# Patient Record
Sex: FEMALE | Race: WHITE
[De-identification: names, ages, dates, MRNs, and addresses within clinical notes are randomized per-mention and may not be internally consistent; named-entity substitution may affect disease eponyms.]

---

## 2019-03-19 NOTE — NUR
PT RESTING QUIETLY, NO ACUTE DISTRESS NOTED, RESP EVEN AND UNLABORED. CALL 
LIGHT WITHIN REACH. WILL CONTINUE TO MONITOR PT CLOSELY.

## 2019-03-19 NOTE — NUR
IV removed. Catheter intact and site benign. Pressure and 4x4 applied to site. 
No bleeding noted. Patient discharged to home in stable condition. Written and 
verbal after care instructions given. Patient verbalizes understanding of 
instruction. ambulatory with a steady gait noted. advice pt not to drive or 
operate any machinery due to pt was given benadryl. pt verbalize understanding. 
pt friend at bedside to take pt home.

## 2019-05-03 ENCOUNTER — HOSPITAL ENCOUNTER (INPATIENT)
Dept: HOSPITAL 54 - ER | Age: 44
LOS: 2 days | Discharge: HOME | DRG: 384 | End: 2019-05-05
Attending: INTERNAL MEDICINE | Admitting: INTERNAL MEDICINE
Payer: COMMERCIAL

## 2019-05-03 VITALS — BODY MASS INDEX: 20.76 KG/M2 | WEIGHT: 137 LBS | HEIGHT: 68 IN

## 2019-05-03 VITALS — DIASTOLIC BLOOD PRESSURE: 56 MMHG | SYSTOLIC BLOOD PRESSURE: 110 MMHG

## 2019-05-03 VITALS — SYSTOLIC BLOOD PRESSURE: 110 MMHG | DIASTOLIC BLOOD PRESSURE: 68 MMHG

## 2019-05-03 VITALS — SYSTOLIC BLOOD PRESSURE: 113 MMHG | DIASTOLIC BLOOD PRESSURE: 52 MMHG

## 2019-05-03 DIAGNOSIS — G89.4: ICD-10-CM
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
## 2019-05-03 DIAGNOSIS — F41.9: ICD-10-CM

## 2019-05-03 DIAGNOSIS — J45.20: ICD-10-CM

## 2019-05-03 DIAGNOSIS — E87.6: ICD-10-CM

## 2019-05-03 DIAGNOSIS — G93.6: ICD-10-CM

## 2019-05-03 DIAGNOSIS — Z88.0: ICD-10-CM

## 2019-05-03 DIAGNOSIS — S00.93XA: Primary | ICD-10-CM

## 2019-05-03 DIAGNOSIS — E87.1: ICD-10-CM

## 2019-05-03 DIAGNOSIS — I10: ICD-10-CM

## 2019-05-03 DIAGNOSIS — G62.9: ICD-10-CM
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
## 2019-05-03 LAB
ALBUMIN SERPL BCP-MCNC: 3.7 G/DL (ref 3.4–5)
ALP SERPL-CCNC: 47 U/L (ref 46–116)
ALT SERPL W P-5'-P-CCNC: 37 U/L (ref 12–78)
APPEARANCE UR: CLEAR
AST SERPL W P-5'-P-CCNC: 60 U/L (ref 15–37)
BASOPHILS # BLD AUTO: 0.1 /CMM (ref 0–0.2)
BASOPHILS NFR BLD AUTO: 0.7 % (ref 0–2)
BILIRUB DIRECT SERPL-MCNC: 0.2 MG/DL (ref 0–0.2)
BILIRUB SERPL-MCNC: 0.9 MG/DL (ref 0.2–1)
BILIRUB UR QL STRIP: NEGATIVE
BUN SERPL-MCNC: 9 MG/DL (ref 7–18)
CALCIUM SERPL-MCNC: 8.6 MG/DL (ref 8.5–10.1)
CHLORIDE SERPL-SCNC: 96 MMOL/L (ref 98–107)
CO2 SERPL-SCNC: 27 MMOL/L (ref 21–32)
COLOR UR: YELLOW
CREAT SERPL-MCNC: 0.8 MG/DL (ref 0.6–1.3)
EOSINOPHIL NFR BLD AUTO: 2.5 % (ref 0–6)
GLUCOSE SERPL-MCNC: 79 MG/DL (ref 74–106)
GLUCOSE UR STRIP-MCNC: NEGATIVE MG/DL
HCT VFR BLD AUTO: 37 % (ref 33–45)
HGB BLD-MCNC: 12.5 G/DL (ref 11.5–14.8)
HGB UR QL STRIP: NEGATIVE ERY/UL
KETONES UR STRIP-MCNC: (no result) MG/DL
LEUKOCYTE ESTERASE UR QL STRIP: NEGATIVE
LYMPHOCYTES NFR BLD AUTO: 1.8 /CMM (ref 0.8–4.8)
LYMPHOCYTES NFR BLD AUTO: 21.7 % (ref 20–44)
MCHC RBC AUTO-ENTMCNC: 34 G/DL (ref 31–36)
MCV RBC AUTO: 96 FL (ref 82–100)
MONOCYTES NFR BLD AUTO: 0.4 /CMM (ref 0.1–1.3)
MONOCYTES NFR BLD AUTO: 4.2 % (ref 2–12)
NEUTROPHILS # BLD AUTO: 6 /CMM (ref 1.8–8.9)
NEUTROPHILS NFR BLD AUTO: 70.9 % (ref 43–81)
NITRITE UR QL STRIP: NEGATIVE
PH UR STRIP: 6 [PH] (ref 5–8)
PLATELET # BLD AUTO: 360 /CMM (ref 150–450)
POTASSIUM SERPL-SCNC: 4.3 MMOL/L (ref 3.5–5.1)
PROT SERPL-MCNC: 7.5 G/DL (ref 6.4–8.2)
PROT UR QL STRIP: NEGATIVE MG/DL
RBC # BLD AUTO: 3.83 MIL/UL (ref 4–5.2)
RBC #/AREA URNS HPF: (no result) /HPF (ref 0–2)
SODIUM SERPL-SCNC: 134 MMOL/L (ref 136–145)
UROBILINOGEN UR STRIP-MCNC: 0.2 EU/DL
WBC #/AREA URNS HPF: (no result) /HPF (ref 0–3)
WBC NRBC COR # BLD AUTO: 8.4 K/UL (ref 4.3–11)

## 2019-05-03 PROCEDURE — G0378 HOSPITAL OBSERVATION PER HR: HCPCS

## 2019-05-03 RX ADMIN — OXYCODONE HYDROCHLORIDE AND ACETAMINOPHEN PRN UDTAB: 5; 325 TABLET ORAL at 17:07

## 2019-05-03 RX ADMIN — DEXTROSE MONOHYDRATE PRN MG: 50 INJECTION, SOLUTION INTRAVENOUS at 16:34

## 2019-05-03 RX ADMIN — OXYCODONE HYDROCHLORIDE AND ACETAMINOPHEN PRN UDTAB: 5; 325 TABLET ORAL at 21:16

## 2019-05-03 RX ADMIN — TRAMADOL HYDROCHLORIDE PRN MG: 50 TABLET, FILM COATED ORAL at 11:21

## 2019-05-03 RX ADMIN — SODIUM CHLORIDE PRN MLS/HR: 9 INJECTION, SOLUTION INTRAVENOUS at 12:10

## 2019-05-03 RX ADMIN — TRAMADOL HYDROCHLORIDE PRN MG: 50 TABLET, FILM COATED ORAL at 22:27

## 2019-05-03 NOTE — NUR
PT BIBSELF C/C POSTERIOR HEAD PAIN S/P FELT LEGS GIVE OUT, HIT HEAD ON GROUND 
YESTERDAY. NO LOC, HX NEUROPATHY. PT AOX4. NAD NOTED. RESP EVEN AND UNLABORED. 
PT ON MONITOR IN BED 9 WITH FAMILY AT BEDSIDE. WILL CONTINUE TO MONITOR.

## 2019-05-03 NOTE — NUR
RN TELE NOTE 



PATIENT IS AOX3, SPEECH CLEAR,  AT BEDSIDE, ABLE TO MAKE NEEDS KNOWN, ON TELE SR, ON 
ROOM AIR, NO S/SX OF CARDIAC OR RESPIRATORY DISTRESS, LEFT HAND IV PATENT FLUSHING WELL WITH 
NS AT 75 ML/HR, SKIN KEPT CLEAN AND DRY, SAFETY MAINTAINED AT ALL TIMES, BED IN LOW LOCKED 
POSITION, CALL LIGHT WITHIN REACH, WILL CONTINUE TO MONITOR FOR ANY CHANGES IN CONDITION.

## 2019-05-03 NOTE — NUR
TELE RN NOTE 

ASSISTED TO BEDSIDE COMMODE ABLE TO URINATE, INSTRUCTED PATIENT TO ASK FOR ASSISTANCE WHEN 
RHIANNON TO TO GO ON  BSC,  CALL LIGHT WITHIN REACH

## 2019-05-03 NOTE — NUR
PATIENT STATED SHE'S UNABLE TO PROVIDE UA SAMPLE AT THIS TIME, DECIDED TO SIGN 
A PREGNANCY WAIVER TO HAVE THE CT. INFORMED DR. OGDEN PATIENT'S STILL 
NAUSEATED, RECEIVED VERBAL ORDER FOR ZOFRAN 4MG SL X1. ORDER NOTED AND CARRIED 
OUT.

## 2019-05-03 NOTE — NUR
TELE RN NOTE 

 RECEIVED PATIENT I FROM ER WITH DX  BRAIN INJURY AND SMALL HEMORRHAGIC CONTUSION S\P FALL, 
ALERT , ORIENTED X3 VS TAKEN PLACED ON TELE MONITOR SR 61 , C\O SEVERE HEADACHE AND LAW PAIN 
DUE TO ORAL SURGERY RECENTLY,ON RA, NO SOB NOTED AT THIS TIME   , ADMITTED UNDER CARE SARAH ABBASI, PLAN OF CARE DISCUSSED WITH PATIENT  CALL LIGHT WITHIN REACH , SAFETY MEASURE 
OBSERVED, BED IN LOWEST AND LOCKED POSITION, BED ALARM IN PLACE , WILL CONT TO MONITOR 
CLOSELY ,

## 2019-05-03 NOTE — NUR
TELE RN NOTE 

 C\O SEVERE HEADACHE 9\10 /52 CALLED TO DR ABBASI WITH ORDER PERCOCET PO , PATIENT 
ALLERGY HAS TO HYDROCODONE BUT PATIENT STATED ITS OK SHE ,WAS TAKEN BEFORE

## 2019-05-03 NOTE — NUR
TELE RN NOTE

 TRAMADOL 50 MG PO GIVEN AT 1130 PAIN SLIGHTLY SUBSIDED BUT STILL IN PAIN ,WILL CONT TO 
MONITOR CLOSELY

## 2019-05-03 NOTE — NUR
DR. OGDEN INFORMED PATIENT SHE WILL BE ADMITTED IN THE HOSPITAL, LABS DRAWN 
AND SENT TO LAB, ESTABLISHED AN IV ACCESS ON RFA G20. PATIENT CRYING AND 
ANXIOUS ABOUT STAYING IN THE HOSPITAL.

## 2019-05-04 VITALS — DIASTOLIC BLOOD PRESSURE: 74 MMHG | SYSTOLIC BLOOD PRESSURE: 110 MMHG

## 2019-05-04 VITALS — DIASTOLIC BLOOD PRESSURE: 61 MMHG | SYSTOLIC BLOOD PRESSURE: 125 MMHG

## 2019-05-04 VITALS — DIASTOLIC BLOOD PRESSURE: 95 MMHG | SYSTOLIC BLOOD PRESSURE: 143 MMHG

## 2019-05-04 VITALS — SYSTOLIC BLOOD PRESSURE: 86 MMHG | DIASTOLIC BLOOD PRESSURE: 47 MMHG

## 2019-05-04 VITALS — DIASTOLIC BLOOD PRESSURE: 47 MMHG | SYSTOLIC BLOOD PRESSURE: 96 MMHG

## 2019-05-04 VITALS — SYSTOLIC BLOOD PRESSURE: 139 MMHG | DIASTOLIC BLOOD PRESSURE: 74 MMHG

## 2019-05-04 LAB
BASOPHILS # BLD AUTO: 0 /CMM (ref 0–0.2)
BASOPHILS NFR BLD AUTO: 0.7 % (ref 0–2)
BUN SERPL-MCNC: 6 MG/DL (ref 7–18)
CALCIUM SERPL-MCNC: 8.2 MG/DL (ref 8.5–10.1)
CHLORIDE SERPL-SCNC: 102 MMOL/L (ref 98–107)
CO2 SERPL-SCNC: 26 MMOL/L (ref 21–32)
CREAT SERPL-MCNC: 0.8 MG/DL (ref 0.6–1.3)
EOSINOPHIL NFR BLD AUTO: 4.2 % (ref 0–6)
GLUCOSE SERPL-MCNC: 136 MG/DL (ref 74–106)
HCT VFR BLD AUTO: 34 % (ref 33–45)
HGB BLD-MCNC: 11.2 G/DL (ref 11.5–14.8)
LYMPHOCYTES NFR BLD AUTO: 1.6 /CMM (ref 0.8–4.8)
LYMPHOCYTES NFR BLD AUTO: 32.4 % (ref 20–44)
MAGNESIUM SERPL-MCNC: 2 MG/DL (ref 1.8–2.4)
MCHC RBC AUTO-ENTMCNC: 33 G/DL (ref 31–36)
MCV RBC AUTO: 97 FL (ref 82–100)
MONOCYTES NFR BLD AUTO: 0.3 /CMM (ref 0.1–1.3)
MONOCYTES NFR BLD AUTO: 6 % (ref 2–12)
NEUTROPHILS # BLD AUTO: 2.7 /CMM (ref 1.8–8.9)
NEUTROPHILS NFR BLD AUTO: 56.7 % (ref 43–81)
PHOSPHATE SERPL-MCNC: 3.1 MG/DL (ref 2.5–4.9)
PLATELET # BLD AUTO: 292 /CMM (ref 150–450)
POTASSIUM SERPL-SCNC: 3.4 MMOL/L (ref 3.5–5.1)
RBC # BLD AUTO: 3.5 MIL/UL (ref 4–5.2)
SODIUM SERPL-SCNC: 137 MMOL/L (ref 136–145)
WBC NRBC COR # BLD AUTO: 4.8 K/UL (ref 4.3–11)

## 2019-05-04 RX ADMIN — OXYCODONE HYDROCHLORIDE AND ACETAMINOPHEN PRN UDTAB: 5; 325 TABLET ORAL at 14:18

## 2019-05-04 RX ADMIN — SODIUM CHLORIDE PRN MLS/HR: 9 INJECTION, SOLUTION INTRAVENOUS at 03:33

## 2019-05-04 RX ADMIN — ACETAMINOPHEN PRN MG: 325 TABLET ORAL at 04:49

## 2019-05-04 RX ADMIN — DEXTROSE MONOHYDRATE PRN MG: 50 INJECTION, SOLUTION INTRAVENOUS at 11:40

## 2019-05-04 RX ADMIN — OXYCODONE HYDROCHLORIDE AND ACETAMINOPHEN PRN UDTAB: 5; 325 TABLET ORAL at 08:03

## 2019-05-04 RX ADMIN — OXYCODONE HYDROCHLORIDE AND ACETAMINOPHEN PRN UDTAB: 5; 325 TABLET ORAL at 23:42

## 2019-05-04 RX ADMIN — DEXTROSE MONOHYDRATE PRN MG: 50 INJECTION, SOLUTION INTRAVENOUS at 23:37

## 2019-05-04 RX ADMIN — OXYCODONE HYDROCHLORIDE AND ACETAMINOPHEN PRN UDTAB: 5; 325 TABLET ORAL at 03:33

## 2019-05-04 RX ADMIN — TRAMADOL HYDROCHLORIDE PRN MG: 50 TABLET, FILM COATED ORAL at 11:40

## 2019-05-04 RX ADMIN — TRAMADOL HYDROCHLORIDE PRN MG: 50 TABLET, FILM COATED ORAL at 20:46

## 2019-05-04 NOTE — NUR
TELE RN NOTES-- CALLED DR. TYSON: PT C/O HEADACHES THAT WONT GO AWAY AND DIFFICULTY 
URINATING. PAIN MEDICATIONS HAVE BEEN INEFFECTIVE. WAITING A CALL BACK.

## 2019-05-04 NOTE — NUR
TELE RN NOTES-- DR. CAPUTO MADE AWARE THAT PT AND  WANTS TO SPEAK WITH HIM. ASKED 
DR. CAPUTO FOR OFFICE NUMBER AND STATED THAT HE WILL BE IN TO SEE THE PT IN 20 MINS. PT 
MADE AWARE.

## 2019-05-04 NOTE — NUR
DR. LANDRY AND DR. CAPUTO NOTIFIED REGARDING PT. PERSISTENT HEADACHE AND ITCHINESS,NO NEW 
ORDERS FOR PAIN BUT PER NEURO ORDERED REPAT HEAD CT WITHOUT CONTRAST, AWAITS RADIOLOGY FOR 
CT,PT. NOTIFIED BY PRIMARY RN REGARDING PLAN OF CARE.

## 2019-05-04 NOTE — NUR
TELE RN NOTES-- PT WAS SEEN AND EXAMINED BY DR. CAPUTO W/ ORDERS FOR ONE TIME DOSE OF 
PERCOCET 5/325 2 TABS PO ONE TIME, ZOFRAN 4MG IVP X1 AND TO ADMINISTER BENADRYL AS ORDERED. 
ORDERS READ BACK AND VERIFIED, NOTED AND CARRIED OUT.

## 2019-05-04 NOTE — NUR
TELE RN OPENING NOTES



PATIENT RESTING IN BED. BOYFRIEND AT BEDSIDE. A/O X4. RESPIRATIONS ARE EVEN AND UNLABORED, 
NOT IN ANY ACUTE DISTRESS NOTED. DENIES ANY SOB. IV SITE TO L HAND INTACT, 0.9% NS RUNNING 
AT 75ML/HR, NO INFILTRATION NOTED. DRESSING CLEAN AND DRY. SAFETY MEASURES ARE IN PLACE. 
REMINDED PT TO USE CALL LIGHT WHEN ASSISTANCE IS NEEDED, CALL LIGHT WITHIN REACH. WILL CONT 
TO MONITOR PATIENT CLOSELY.

## 2019-05-04 NOTE — NUR
TELE RN NOTES-- RELAYED CT HEAD RESULTS TO DR. CAPUTO AND STATED THAT HE WILL BE IN OT SEE 
THE PT IN 1-2 HOURS.

## 2019-05-04 NOTE — NUR
TELE RN NOTES-- PT IS REQUESTING FOR A MUSCLE RELAXANT. CALLED DR. CAPUTO, STILL WAITING A 
CALL BACK.

## 2019-05-04 NOTE — NUR
TELE RN NOTES-- PT WAS SEEN AND EXAMINED BY DR. HIDALGO. Viviana HI WENT INTO DETAIL RE: 
THE CT HEAD RESULTS AND SIGNS AND SYMPTOMS. PT AND  BOTH ARE AWARE AND AGREED WITH 
THE POC.

## 2019-05-04 NOTE — NUR
head ct done ,awaits result.offered ice chips r/t nausea.pt s/p medicated anti nausea meds 
an hour ago,will continue to monitor.

## 2019-05-04 NOTE — NUR
TELE RN NOTES-- RECEIVED A CALL BACK FROM DR. CAPUTO RE: PT REQUESTING A MUSCLE RELAXANT. 
PER DR. CAPUTO, "NO." PT MADE AWARE THAT A MUSCLE RELAXANT IS NOT BENEFICIAL DUE TO HER 
CONDITION.

## 2019-05-04 NOTE — NUR
TELE RN OPENING NOTES



RECEIVED PT LAYING IN BED WITH HOB SLIGHTLY ELEVATED. PT IS A/O X3, AFEBRILE. RESPIRATIONS 
ARE EVEN AND UNLABORED, NOT IN ANY ACUTE DISTRESS NOTED.C/O GENERALIZED PAIN W/ PL 10/10. 
WILL MEDICATE ACCORDINGLY. DENIES ANY SOB, N/V. IV SITE TO L HAND INTACT, NO INFILTRATION 
NOTED. DRESSING KEPT CLEAN AND DRY. SAFETY MEASURES ARE IN PLACE. INSTRUCTED PT TO USE CALL 
LIGHT WHEN ASSISTANCE IS NEEDED, CALL LIGHT IS LEFT WITHIN REACH. WILL MONITOR THROUGHOUT 
SHIFT FOR CONTINUITY OF CARE.

## 2019-05-04 NOTE — NUR
TELE RN NOTES-- CALLED DR. LOWRY RE: PT C/O ITCHINESS AFTER RECEIVING PERCOCET AND IS 
REQUESTING ANOTHER DOSE OF BENADRYL. PT IS STILL C/O "STRONG HEADACHES" THAT WONT GO AWAY. 
STILL WAITING A CALL BACK.

## 2019-05-04 NOTE — NUR
TELE RN CLOSING NOTE



ALL DUE MEDS GIVEN, NEEDS MET AND RENDERED. PT REMAINS A/O X4, AFEBRILE. RESPIRATIONS ARE 
EVEN AND UNLABORED, NOT IN ANY ACUTE DISTRESS NOTED. PT MEDICATED FOR PAIN AND NAUSEA, NOTED 
TO BE EFFECTIVE AT THIS TIME. DENIES ANY SOB. IV SITE TO L HAND INTACT, NO INFILTRATION 
NOTED. DRESSING KEPT CLEAN AND DRY. SAFETY MEASURES ARE IN PLACE. REMINDED PT TO USE CALL 
LIGHT WHEN ASSISTANCE IS NEEDED, CALL LIGHT IS LEFT WITHIN REACH. WILL ENDORSE TO NEXT SHIFT 
FOR CONTINUITY OF CARE.

## 2019-05-05 VITALS — SYSTOLIC BLOOD PRESSURE: 148 MMHG | DIASTOLIC BLOOD PRESSURE: 97 MMHG

## 2019-05-05 VITALS — SYSTOLIC BLOOD PRESSURE: 151 MMHG | DIASTOLIC BLOOD PRESSURE: 90 MMHG

## 2019-05-05 VITALS — DIASTOLIC BLOOD PRESSURE: 83 MMHG | SYSTOLIC BLOOD PRESSURE: 166 MMHG

## 2019-05-05 VITALS — SYSTOLIC BLOOD PRESSURE: 140 MMHG | DIASTOLIC BLOOD PRESSURE: 93 MMHG

## 2019-05-05 LAB
BUN SERPL-MCNC: 3 MG/DL (ref 7–18)
CALCIUM SERPL-MCNC: 9 MG/DL (ref 8.5–10.1)
CHLORIDE SERPL-SCNC: 106 MMOL/L (ref 98–107)
CO2 SERPL-SCNC: 24 MMOL/L (ref 21–32)
CREAT SERPL-MCNC: 0.6 MG/DL (ref 0.6–1.3)
GLUCOSE SERPL-MCNC: 112 MG/DL (ref 74–106)
POTASSIUM SERPL-SCNC: 3.9 MMOL/L (ref 3.5–5.1)
SODIUM SERPL-SCNC: 142 MMOL/L (ref 136–145)

## 2019-05-05 RX ADMIN — ACETAMINOPHEN PRN MG: 325 TABLET ORAL at 11:03

## 2019-05-05 RX ADMIN — OXYCODONE HYDROCHLORIDE AND ACETAMINOPHEN PRN UDTAB: 5; 325 TABLET ORAL at 09:37

## 2019-05-05 RX ADMIN — SODIUM CHLORIDE PRN MLS/HR: 9 INJECTION, SOLUTION INTRAVENOUS at 01:26

## 2019-05-05 RX ADMIN — DEXTROSE MONOHYDRATE PRN MG: 50 INJECTION, SOLUTION INTRAVENOUS at 08:13

## 2019-05-05 NOTE — NUR
MS RN NOTES



WOUND DOCUMENTATION PICTURE TAKEN ON LEFT 2ND TOE. PATIENT DID NOT WANT TO HAVE LIGHTS 
TURNED ON. EXPLAINED THAT PICTURE PRINT OUT WILL BE DARK BUT PATIENT INSISTED TO KEEP LIGHTS 
OFF.

## 2019-05-05 NOTE — NUR
TELE RN CLOSING NOTES



PATIENT SLEEPING IN BED, BUT EASY TO AROUSE. A/O X4. RESPIRATIONS ARE EVEN AND UNLABORED, 
NOT IN ANY ACUTE DISTRESS NOTED AT THIS TIME. DENIES ANY SOB. IV SITE TO L HAND INTACT, 
DRESSING CLEAN AND DRY. SAFETY MEASURES ARE IN PLACE. REMINDED PT TO USE CALL LIGHT WHEN 
ASSISTANCE IS NEEDED, CALL LIGHT WITHIN REACH. ALL MD ORDERS ATTENDED. WILL ENDORSE TO DAY 
SHIFT NURSE FOR TANNER.

## 2019-05-05 NOTE — NUR
TELE RN NOTES



PATIENT RECEIVED RESTING INSIDE ROOM. SLEEPING, EASILY AROUSABLE THROUGH VERBAL AND TACTILE 
STIMULI. BREATHING EVEN AND UNLABORED. NO ACUTE DISTRESS AT THIS TIME. IV INTACT AND PATENT. 
CONTINUE ON TELEMETRY, CARDIAC MONITOR IN PLACE,  BPM. NO C/O PAIN OR DISCOMFORT AT 
THIS TIME. SAFETY PRECAUTIONS IN PLACE. WILL CONTINUE TO MONITOR. BED LOCKED AND IN LOW 
POSITION. BILATERAL UPPER SIDE RAILS UP AND LOCKED. CALL LIGHT WITHIN EASY REACH

## 2019-05-05 NOTE — NUR
MS RN NOTES



PATIENT FOR DISCHARGE HOME TODAY. DISCHARGE INSTRUCTIONS GIVEN AND PATIENT VERBALIZED 
UNDERSTANDING. IV REMOVED WITH MINIMAL BLEEDING NOTED, IV TIP INTACT, PRESSURE DRESSING 
PLACED ON SITE. WRISTBAND REMOVED FROM WRIST AND PATIENT ASKED IF SHE CAN KEEP THE 
WRISTBAND. ALL BELONGINGS COMPLETE, NO REPORT OF MISSING INVENTORY. WRITTEN PRESCRIPTION 
GIVEN TO PATIENT. REFUSED TO HAVE PICTURE OF LEFT 2ND TOE RETAKEN FOR WOUND DOCUMENTATION. 
RISKS AND BENEFITS EXPLAINED BUT TO NO AVAIL, PATIENT STRONGLY REFUSED. PATIENT LEFT UNIT AT 
1315 VIA WHEELCHAIR, ACCOMPANIED BY NURSING STAFF TO PARKING LOT. LEFT HOSPITAL PREMISES VIA 
PRIVATE CAR WITH ANTOLIN IBARRA. MD AWARE.

## 2019-05-05 NOTE — NUR
MS RN NOTES



PATIENT SEEN BY DR. DICKINSON (NEURO). VERBALIZED THAT PATIENT IS CLEARED TO DISCHARGE PER 
NEURO. DR LOWRY MADE AWARE. WILL CONTINUE TO MONITOR

## 2019-05-05 NOTE — NUR
TELE RN NOTES



PATIENT BEGGED FOR AMBIEN AND STATED "PLEASE, I HAVE INSOMNIA AND I REALLY NEED IT. PLEASE." 
WILL GIVE AMBIEN FOR SLEEP/INSOMNIA. WILL CONTINUE TO MONITOR PT.

## 2019-05-31 ENCOUNTER — HOSPITAL ENCOUNTER (EMERGENCY)
Dept: HOSPITAL 54 - ER | Age: 44
Discharge: HOME | End: 2019-05-31
Payer: COMMERCIAL

## 2019-05-31 VITALS — HEIGHT: 68 IN | BODY MASS INDEX: 19.55 KG/M2 | WEIGHT: 129 LBS

## 2019-05-31 VITALS — DIASTOLIC BLOOD PRESSURE: 98 MMHG | SYSTOLIC BLOOD PRESSURE: 155 MMHG

## 2019-05-31 DIAGNOSIS — G62.9: ICD-10-CM

## 2019-05-31 DIAGNOSIS — F17.200: ICD-10-CM

## 2019-05-31 DIAGNOSIS — Z79.899: ICD-10-CM

## 2019-05-31 DIAGNOSIS — I10: ICD-10-CM

## 2019-05-31 DIAGNOSIS — Z88.6: ICD-10-CM

## 2019-05-31 DIAGNOSIS — Z88.0: ICD-10-CM

## 2019-05-31 DIAGNOSIS — R11.2: Primary | ICD-10-CM

## 2019-05-31 LAB
ALBUMIN SERPL BCP-MCNC: 4.6 G/DL (ref 3.4–5)
ALP SERPL-CCNC: 46 U/L (ref 46–116)
ALT SERPL W P-5'-P-CCNC: 25 U/L (ref 12–78)
AST SERPL W P-5'-P-CCNC: 31 U/L (ref 15–37)
BASOPHILS # BLD AUTO: 0 /CMM (ref 0–0.2)
BASOPHILS NFR BLD AUTO: 0.5 % (ref 0–2)
BILIRUB DIRECT SERPL-MCNC: 0.4 MG/DL (ref 0–0.2)
BILIRUB SERPL-MCNC: 2 MG/DL (ref 0.2–1)
BUN SERPL-MCNC: 7 MG/DL (ref 7–18)
CALCIUM SERPL-MCNC: 9.3 MG/DL (ref 8.5–10.1)
CHLORIDE SERPL-SCNC: 96 MMOL/L (ref 98–107)
CO2 SERPL-SCNC: 26 MMOL/L (ref 21–32)
CREAT SERPL-MCNC: 0.7 MG/DL (ref 0.6–1.3)
EOSINOPHIL NFR BLD AUTO: 0.1 % (ref 0–6)
ETHANOL SERPL-MCNC: 90 MG/DL (ref 0–0)
GLUCOSE SERPL-MCNC: 82 MG/DL (ref 74–106)
HCT VFR BLD AUTO: 46 % (ref 33–45)
HGB BLD-MCNC: 15.5 G/DL (ref 11.5–14.8)
LIPASE SERPL-CCNC: 125 U/L (ref 73–393)
LYMPHOCYTES NFR BLD AUTO: 1.2 /CMM (ref 0.8–4.8)
LYMPHOCYTES NFR BLD AUTO: 13.5 % (ref 20–44)
MCHC RBC AUTO-ENTMCNC: 34 G/DL (ref 31–36)
MCV RBC AUTO: 94 FL (ref 82–100)
MONOCYTES NFR BLD AUTO: 0.7 /CMM (ref 0.1–1.3)
MONOCYTES NFR BLD AUTO: 7.5 % (ref 2–12)
NEUTROPHILS # BLD AUTO: 7.1 /CMM (ref 1.8–8.9)
NEUTROPHILS NFR BLD AUTO: 78.4 % (ref 43–81)
PLATELET # BLD AUTO: 297 /CMM (ref 150–450)
POTASSIUM SERPL-SCNC: 4 MMOL/L (ref 3.5–5.1)
PROT SERPL-MCNC: 8.2 G/DL (ref 6.4–8.2)
RBC # BLD AUTO: 4.84 MIL/UL (ref 4–5.2)
SODIUM SERPL-SCNC: 138 MMOL/L (ref 136–145)
WBC NRBC COR # BLD AUTO: 9.1 K/UL (ref 4.3–11)

## 2019-05-31 PROCEDURE — 80076 HEPATIC FUNCTION PANEL: CPT

## 2019-05-31 PROCEDURE — 84702 CHORIONIC GONADOTROPIN TEST: CPT

## 2019-05-31 PROCEDURE — 96361 HYDRATE IV INFUSION ADD-ON: CPT

## 2019-05-31 PROCEDURE — 80307 DRUG TEST PRSMV CHEM ANLYZR: CPT

## 2019-05-31 PROCEDURE — 96374 THER/PROPH/DIAG INJ IV PUSH: CPT

## 2019-05-31 PROCEDURE — 83690 ASSAY OF LIPASE: CPT

## 2019-05-31 PROCEDURE — 85025 COMPLETE CBC W/AUTO DIFF WBC: CPT

## 2019-05-31 PROCEDURE — 36415 COLL VENOUS BLD VENIPUNCTURE: CPT

## 2019-05-31 PROCEDURE — G0480 DRUG TEST DEF 1-7 CLASSES: HCPCS

## 2019-05-31 PROCEDURE — 96375 TX/PRO/DX INJ NEW DRUG ADDON: CPT

## 2019-05-31 PROCEDURE — 96376 TX/PRO/DX INJ SAME DRUG ADON: CPT

## 2019-05-31 PROCEDURE — 99283 EMERGENCY DEPT VISIT LOW MDM: CPT

## 2019-05-31 PROCEDURE — 80048 BASIC METABOLIC PNL TOTAL CA: CPT

## 2019-05-31 NOTE — NUR
C/O VOMITING STARTED 10 HRS AGO,-DIARRHEA. PATIENT A/OX3, BREATHING EVEN AND 
UNLABORED, NO SOB NOTED, PLACED ON THE MONITOR.

## 2019-05-31 NOTE — NUR
Patient stated she "felt better." Denies nausea/vomiting at this time. PIV 
removed. Rx provided. Patient discharged to home in stable condition. Written 
and verbal after care instructions given. Patient verbalizes understanding of 
instruction.

## 2019-06-17 ENCOUNTER — HOSPITAL ENCOUNTER (EMERGENCY)
Dept: HOSPITAL 54 - ER | Age: 44
Discharge: HOME | End: 2019-06-17
Payer: COMMERCIAL

## 2019-06-17 VITALS — DIASTOLIC BLOOD PRESSURE: 69 MMHG | SYSTOLIC BLOOD PRESSURE: 97 MMHG

## 2019-06-17 VITALS — BODY MASS INDEX: 19.7 KG/M2 | WEIGHT: 130 LBS | HEIGHT: 68 IN

## 2019-06-17 DIAGNOSIS — F13.239: ICD-10-CM

## 2019-06-17 DIAGNOSIS — J45.909: ICD-10-CM

## 2019-06-17 DIAGNOSIS — I10: ICD-10-CM

## 2019-06-17 DIAGNOSIS — F41.9: Primary | ICD-10-CM

## 2019-06-17 DIAGNOSIS — Z88.5: ICD-10-CM

## 2019-06-17 DIAGNOSIS — F17.200: ICD-10-CM

## 2019-06-17 DIAGNOSIS — F10.10: ICD-10-CM

## 2019-06-17 DIAGNOSIS — Z88.0: ICD-10-CM
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
## 2019-06-17 NOTE — NUR
PT BIBSELF C/O SOB X1 DAY. ALSO C/O DRY COUGH. PT CURRENTLY 100% ON ROOM AIR. 
NOTED TACHYPNEA. PT AAOX4. VITAL SIGNS STABLE. SKIN WARM AND INTACT. PLACED ON 
MONITOR, WILL CONTINUE TO MONITOR.

## 2019-06-28 ENCOUNTER — HOSPITAL ENCOUNTER (EMERGENCY)
Dept: HOSPITAL 54 - ER | Age: 44
Discharge: LEFT BEFORE DISCHARGE | End: 2019-06-28
Payer: COMMERCIAL

## 2019-06-28 DIAGNOSIS — Z53.21: Primary | ICD-10-CM

## 2019-09-22 ENCOUNTER — HOSPITAL ENCOUNTER (EMERGENCY)
Dept: HOSPITAL 54 - ER | Age: 44
Discharge: PSYCHIATRIC OR SUBSTANCE USE FACILITY | End: 2019-09-22
Payer: COMMERCIAL

## 2019-09-22 VITALS — BODY MASS INDEX: 20.4 KG/M2 | WEIGHT: 130 LBS | HEIGHT: 67 IN

## 2019-09-22 VITALS — SYSTOLIC BLOOD PRESSURE: 135 MMHG | DIASTOLIC BLOOD PRESSURE: 78 MMHG

## 2019-09-22 DIAGNOSIS — J45.909: ICD-10-CM

## 2019-09-22 DIAGNOSIS — F17.200: ICD-10-CM

## 2019-09-22 DIAGNOSIS — Z88.0: ICD-10-CM

## 2019-09-22 DIAGNOSIS — Z88.6: ICD-10-CM

## 2019-09-22 DIAGNOSIS — R11.2: ICD-10-CM

## 2019-09-22 DIAGNOSIS — Z79.899: ICD-10-CM

## 2019-09-22 DIAGNOSIS — R10.11: ICD-10-CM

## 2019-09-22 DIAGNOSIS — G62.9: ICD-10-CM

## 2019-09-22 DIAGNOSIS — I10: ICD-10-CM

## 2019-09-22 DIAGNOSIS — R10.13: Primary | ICD-10-CM

## 2019-09-22 DIAGNOSIS — K08.89: ICD-10-CM

## 2019-09-22 LAB
ALBUMIN SERPL BCP-MCNC: 4.1 G/DL (ref 3.4–5)
ALP SERPL-CCNC: 38 U/L (ref 46–116)
ALT SERPL W P-5'-P-CCNC: 140 U/L (ref 12–78)
AST SERPL W P-5'-P-CCNC: 144 U/L (ref 15–37)
BASOPHILS # BLD AUTO: 0.1 /CMM (ref 0–0.2)
BASOPHILS NFR BLD AUTO: 1 % (ref 0–2)
BILIRUB DIRECT SERPL-MCNC: 0.2 MG/DL (ref 0–0.2)
BILIRUB SERPL-MCNC: 0.9 MG/DL (ref 0.2–1)
BUN SERPL-MCNC: 14 MG/DL (ref 7–18)
CALCIUM SERPL-MCNC: 9.2 MG/DL (ref 8.5–10.1)
CHLORIDE SERPL-SCNC: 104 MMOL/L (ref 98–107)
CO2 SERPL-SCNC: 24 MMOL/L (ref 21–32)
CREAT SERPL-MCNC: 1 MG/DL (ref 0.6–1.3)
EOSINOPHIL NFR BLD AUTO: 0.9 % (ref 0–6)
GLUCOSE SERPL-MCNC: 116 MG/DL (ref 74–106)
HCT VFR BLD AUTO: 41 % (ref 33–45)
HGB BLD-MCNC: 13.6 G/DL (ref 11.5–14.8)
KETONES UR STRIP-MCNC: 80 MG/DL
LIPASE SERPL-CCNC: 95 U/L (ref 73–393)
LYMPHOCYTES NFR BLD AUTO: 1.2 /CMM (ref 0.8–4.8)
LYMPHOCYTES NFR BLD AUTO: 20.2 % (ref 20–44)
MCHC RBC AUTO-ENTMCNC: 33 G/DL (ref 31–36)
MCV RBC AUTO: 95 FL (ref 82–100)
MONOCYTES NFR BLD AUTO: 0.3 /CMM (ref 0.1–1.3)
MONOCYTES NFR BLD AUTO: 5.1 % (ref 2–12)
NEUTROPHILS # BLD AUTO: 4.4 /CMM (ref 1.8–8.9)
NEUTROPHILS NFR BLD AUTO: 72.8 % (ref 43–81)
PH UR STRIP: 5.5 [PH] (ref 5–8)
PLATELET # BLD AUTO: 389 /CMM (ref 150–450)
POTASSIUM SERPL-SCNC: 3.5 MMOL/L (ref 3.5–5.1)
PROT SERPL-MCNC: 7.5 G/DL (ref 6.4–8.2)
RBC # BLD AUTO: 4.35 MIL/UL (ref 4–5.2)
RBC #/AREA URNS HPF: (no result) /HPF (ref 0–2)
SODIUM SERPL-SCNC: 141 MMOL/L (ref 136–145)
UROBILINOGEN UR STRIP-MCNC: 0.2 EU/DL
WBC #/AREA URNS HPF: (no result) /HPF
WBC #/AREA URNS HPF: (no result) /HPF (ref 0–3)
WBC NRBC COR # BLD AUTO: 6.1 K/UL (ref 4.3–11)

## 2019-09-22 PROCEDURE — 84703 CHORIONIC GONADOTROPIN ASSAY: CPT

## 2019-09-22 PROCEDURE — 83690 ASSAY OF LIPASE: CPT

## 2019-09-22 PROCEDURE — 87086 URINE CULTURE/COLONY COUNT: CPT

## 2019-09-22 PROCEDURE — 99285 EMERGENCY DEPT VISIT HI MDM: CPT

## 2019-09-22 PROCEDURE — 96375 TX/PRO/DX INJ NEW DRUG ADDON: CPT

## 2019-09-22 PROCEDURE — 76705 ECHO EXAM OF ABDOMEN: CPT

## 2019-09-22 PROCEDURE — 96376 TX/PRO/DX INJ SAME DRUG ADON: CPT

## 2019-09-22 PROCEDURE — 80076 HEPATIC FUNCTION PANEL: CPT

## 2019-09-22 PROCEDURE — 36415 COLL VENOUS BLD VENIPUNCTURE: CPT

## 2019-09-22 PROCEDURE — 85025 COMPLETE CBC W/AUTO DIFF WBC: CPT

## 2019-09-22 PROCEDURE — 96374 THER/PROPH/DIAG INJ IV PUSH: CPT

## 2019-09-22 PROCEDURE — 96361 HYDRATE IV INFUSION ADD-ON: CPT

## 2019-09-22 PROCEDURE — 80048 BASIC METABOLIC PNL TOTAL CA: CPT

## 2019-09-22 PROCEDURE — 81001 URINALYSIS AUTO W/SCOPE: CPT

## 2019-09-22 NOTE — NUR
SPOKE TO CHRIS TO INFORM HER THAT PT NEEDS ERCP PROCEDURE AND NEEDS TO BE 
TRANSFERRED TO ANOTHER HOSPITAL. SHE WILL CALL BACK WITH INFORMATION

## 2019-09-22 NOTE — NUR
PT STS ABD PAIN IS BACK & ALSO C/O NAUSEA. MEDICATED PER ISAI'S ORDER. PT 
VANCE WELL. WILL CONT TO MONITOR.

## 2019-09-22 NOTE — NUR
SPOKE TO DEMETRIA  . PT WILL BE GOING TO [facility]. 
ACCEPTING MD VILLEGAS . SHE WILL CALL BACK WITH A BED

## 2019-09-22 NOTE — NUR
MEDICATED FOR NAUSEA PER DR. SHELLEY'S ORDER, PT VANCE WELL. PT EN ROUTE TO [facility] BY [facility] FOR CONT OF CARE.

## 2019-09-22 NOTE — NUR
ORAL PAIN, UNABLE TO TOLERATE FOOD AND MEDICINE  SINCE SATURDAY. ORAL SURGERY 
DONE 9/9/19. NAUSEATED WHILE IN THE WAITING ROOM.  PT AAOX4, VSS. DENIES CP, 
SOB, DIZZINESS, DIARRHEA @ THIS TIME. PT SEEN & EVAL'D BY MICHAEL ALEX. 
MEDICATED AS ORDERED & WILL CONT TO MONITOR.

## 2019-10-03 ENCOUNTER — HOSPITAL ENCOUNTER (EMERGENCY)
Dept: HOSPITAL 54 - ER | Age: 44
Discharge: HOME | End: 2019-10-03
Payer: COMMERCIAL

## 2019-10-03 VITALS — BODY MASS INDEX: 20.4 KG/M2 | WEIGHT: 130 LBS | HEIGHT: 67 IN

## 2019-10-03 VITALS — DIASTOLIC BLOOD PRESSURE: 65 MMHG | SYSTOLIC BLOOD PRESSURE: 100 MMHG
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
## 2019-10-03 DIAGNOSIS — S09.8XXA: Primary | ICD-10-CM

## 2019-10-03 DIAGNOSIS — F17.200: ICD-10-CM
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
## 2019-10-03 DIAGNOSIS — Z88.0: ICD-10-CM

## 2019-10-03 DIAGNOSIS — F10.129: ICD-10-CM

## 2019-10-03 DIAGNOSIS — I10: ICD-10-CM

## 2019-10-03 DIAGNOSIS — R51: ICD-10-CM

## 2019-10-03 DIAGNOSIS — J45.909: ICD-10-CM
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
## 2019-10-03 DIAGNOSIS — H92.21: ICD-10-CM

## 2019-10-03 DIAGNOSIS — Z88.5: ICD-10-CM

## 2019-10-03 LAB
ALBUMIN SERPL BCP-MCNC: 4.1 G/DL (ref 3.4–5)
ALP SERPL-CCNC: 64 U/L (ref 46–116)
ALT SERPL W P-5'-P-CCNC: 55 U/L (ref 12–78)
AST SERPL W P-5'-P-CCNC: 32 U/L (ref 15–37)
BASOPHILS # BLD AUTO: 0.1 /CMM (ref 0–0.2)
BASOPHILS NFR BLD AUTO: 1.2 % (ref 0–2)
BILIRUB DIRECT SERPL-MCNC: 0.2 MG/DL (ref 0–0.2)
BILIRUB SERPL-MCNC: 0.8 MG/DL (ref 0.2–1)
BUN SERPL-MCNC: 9 MG/DL (ref 7–18)
CALCIUM SERPL-MCNC: 8.6 MG/DL (ref 8.5–10.1)
CHLORIDE SERPL-SCNC: 94 MMOL/L (ref 98–107)
CO2 SERPL-SCNC: 20 MMOL/L (ref 21–32)
CREAT SERPL-MCNC: 0.9 MG/DL (ref 0.6–1.3)
EOSINOPHIL NFR BLD AUTO: 0.8 % (ref 0–6)
ETHANOL SERPL-MCNC: 170 MG/DL (ref 0–0)
GLUCOSE SERPL-MCNC: 59 MG/DL (ref 74–106)
HCT VFR BLD AUTO: 39 % (ref 33–45)
HGB BLD-MCNC: 13.1 G/DL (ref 11.5–14.8)
LYMPHOCYTES NFR BLD AUTO: 1.4 /CMM (ref 0.8–4.8)
LYMPHOCYTES NFR BLD AUTO: 32.8 % (ref 20–44)
MCHC RBC AUTO-ENTMCNC: 33 G/DL (ref 31–36)
MCV RBC AUTO: 93 FL (ref 82–100)
MONOCYTES NFR BLD AUTO: 0.2 /CMM (ref 0.1–1.3)
MONOCYTES NFR BLD AUTO: 5.6 % (ref 2–12)
NEUTROPHILS # BLD AUTO: 2.6 /CMM (ref 1.8–8.9)
NEUTROPHILS NFR BLD AUTO: 59.6 % (ref 43–81)
PLATELET # BLD AUTO: 316 /CMM (ref 150–450)
POTASSIUM SERPL-SCNC: 4.8 MMOL/L (ref 3.5–5.1)
PROT SERPL-MCNC: 7.7 G/DL (ref 6.4–8.2)
RBC # BLD AUTO: 4.23 MIL/UL (ref 4–5.2)
SODIUM SERPL-SCNC: 133 MMOL/L (ref 136–145)
WBC NRBC COR # BLD AUTO: 4.3 K/UL (ref 4.3–11)

## 2019-10-03 PROCEDURE — G0480 DRUG TEST DEF 1-7 CLASSES: HCPCS

## 2019-10-03 NOTE — NUR
TO BED 1 BIB EMS C/O HEADACHE S/P FALL, R EAR BLEEDING NOTED. PT ADMITS TO 
ETOH. PT AAOX4 NO ACUTE DISTRESS NOTED, RESP EVEN AND UNLABORED. PUPILS PERRLA, 
PT ABLE TO MOVE ALL EXTREMITIES WELL WITH BILATERAL EQUAL . PLACE PT ON 
CARDIAC MONITORING, CONTINUOUS POX. ER MD AT BEDSIDE TO EVAL PT WITH ORDERS 
RECEIVED.

## 2019-10-22 ENCOUNTER — HOSPITAL ENCOUNTER (EMERGENCY)
Dept: HOSPITAL 54 - ER | Age: 44
Discharge: HOME | End: 2019-10-22
Payer: COMMERCIAL

## 2019-10-22 VITALS — BODY MASS INDEX: 20.4 KG/M2 | HEIGHT: 67 IN | WEIGHT: 130 LBS

## 2019-10-22 VITALS — DIASTOLIC BLOOD PRESSURE: 74 MMHG | SYSTOLIC BLOOD PRESSURE: 129 MMHG

## 2019-10-22 DIAGNOSIS — F17.200: ICD-10-CM

## 2019-10-22 DIAGNOSIS — G62.9: ICD-10-CM

## 2019-10-22 DIAGNOSIS — J45.909: ICD-10-CM

## 2019-10-22 DIAGNOSIS — Z98.890: ICD-10-CM

## 2019-10-22 DIAGNOSIS — Z88.0: ICD-10-CM

## 2019-10-22 DIAGNOSIS — I10: ICD-10-CM

## 2019-10-22 DIAGNOSIS — Z79.899: ICD-10-CM
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
## 2019-10-22 DIAGNOSIS — F10.20: Primary | ICD-10-CM

## 2019-10-22 DIAGNOSIS — Z88.5: ICD-10-CM

## 2019-12-20 ENCOUNTER — HOSPITAL ENCOUNTER (EMERGENCY)
Dept: HOSPITAL 54 - ER | Age: 44
Discharge: HOME | End: 2019-12-20
Payer: SELF-PAY

## 2019-12-20 VITALS — DIASTOLIC BLOOD PRESSURE: 78 MMHG | SYSTOLIC BLOOD PRESSURE: 139 MMHG

## 2019-12-20 VITALS — BODY MASS INDEX: 20.09 KG/M2 | HEIGHT: 67 IN | WEIGHT: 128 LBS

## 2019-12-20 DIAGNOSIS — Z79.899: ICD-10-CM

## 2019-12-20 DIAGNOSIS — F17.200: ICD-10-CM

## 2019-12-20 DIAGNOSIS — Z88.0: ICD-10-CM

## 2019-12-20 DIAGNOSIS — T78.40XA: Primary | ICD-10-CM

## 2019-12-20 DIAGNOSIS — G62.9: ICD-10-CM

## 2019-12-20 DIAGNOSIS — I10: ICD-10-CM

## 2019-12-20 DIAGNOSIS — Z88.6: ICD-10-CM

## 2019-12-20 DIAGNOSIS — J45.909: ICD-10-CM
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
## 2019-12-20 PROCEDURE — 96374 THER/PROPH/DIAG INJ IV PUSH: CPT

## 2019-12-20 PROCEDURE — 96375 TX/PRO/DX INJ NEW DRUG ADDON: CPT

## 2019-12-20 PROCEDURE — 99283 EMERGENCY DEPT VISIT LOW MDM: CPT

## 2019-12-20 NOTE — NUR
BIBSELF C/O FACIAL SWELLING AND REDDNESS. PT ALSO C/O ITCHING. PT STATES SHE 
REC'D INJECTIONS X2 DAYS AGO AND "THOUGHT IT WOULD JUST GO AWAY, BUT GOT 
WORSE". PT DENIES SOB. PT AAOX4. RESPIRATIONS EVEN AND UNLABORED. SKIN INTACT. 
NO ACUTE DISTRESS NOTED AT THIS TIME. WILL CONTINUE TO MONITOR

## 2020-01-23 ENCOUNTER — HOSPITAL ENCOUNTER (EMERGENCY)
Dept: HOSPITAL 54 - ER | Age: 45
Discharge: HOME | End: 2020-01-23
Payer: COMMERCIAL

## 2020-01-23 VITALS — SYSTOLIC BLOOD PRESSURE: 135 MMHG | DIASTOLIC BLOOD PRESSURE: 84 MMHG

## 2020-01-23 VITALS — BODY MASS INDEX: 19.78 KG/M2 | WEIGHT: 126 LBS | HEIGHT: 67 IN

## 2020-01-23 DIAGNOSIS — Z79.899: ICD-10-CM

## 2020-01-23 DIAGNOSIS — R11.0: ICD-10-CM

## 2020-01-23 DIAGNOSIS — F17.200: ICD-10-CM

## 2020-01-23 DIAGNOSIS — R42: ICD-10-CM

## 2020-01-23 DIAGNOSIS — Z88.0: ICD-10-CM

## 2020-01-23 DIAGNOSIS — G62.9: ICD-10-CM

## 2020-01-23 DIAGNOSIS — I10: ICD-10-CM

## 2020-01-23 DIAGNOSIS — J45.909: ICD-10-CM

## 2020-01-23 DIAGNOSIS — L23.9: Primary | ICD-10-CM

## 2020-01-23 DIAGNOSIS — E80.4: ICD-10-CM

## 2020-01-23 DIAGNOSIS — E87.1: ICD-10-CM

## 2020-01-23 DIAGNOSIS — Z88.6: ICD-10-CM

## 2020-01-23 LAB
ALBUMIN SERPL BCP-MCNC: 4 G/DL (ref 3.4–5)
ALP SERPL-CCNC: 26 U/L (ref 46–116)
ALT SERPL W P-5'-P-CCNC: 19 U/L (ref 12–78)
AST SERPL W P-5'-P-CCNC: 22 U/L (ref 15–37)
BASOPHILS # BLD AUTO: 0 /CMM (ref 0–0.2)
BASOPHILS NFR BLD AUTO: 0.7 % (ref 0–2)
BILIRUB DIRECT SERPL-MCNC: 0.2 MG/DL (ref 0–0.2)
BILIRUB SERPL-MCNC: 1.4 MG/DL (ref 0.2–1)
BUN SERPL-MCNC: 11 MG/DL (ref 7–18)
CALCIUM SERPL-MCNC: 9.3 MG/DL (ref 8.5–10.1)
CHLORIDE SERPL-SCNC: 100 MMOL/L (ref 98–107)
CO2 SERPL-SCNC: 26 MMOL/L (ref 21–32)
CREAT SERPL-MCNC: 0.9 MG/DL (ref 0.6–1.3)
EOSINOPHIL NFR BLD AUTO: 3.6 % (ref 0–6)
GLUCOSE SERPL-MCNC: 86 MG/DL (ref 74–106)
HCT VFR BLD AUTO: 41 % (ref 33–45)
HGB BLD-MCNC: 13.4 G/DL (ref 11.5–14.8)
LYMPHOCYTES NFR BLD AUTO: 2.1 /CMM (ref 0.8–4.8)
LYMPHOCYTES NFR BLD AUTO: 41.5 % (ref 20–44)
MCHC RBC AUTO-ENTMCNC: 33 G/DL (ref 31–36)
MCV RBC AUTO: 95 FL (ref 82–100)
MONOCYTES NFR BLD AUTO: 0.4 /CMM (ref 0.1–1.3)
MONOCYTES NFR BLD AUTO: 7.2 % (ref 2–12)
NEUTROPHILS # BLD AUTO: 2.3 /CMM (ref 1.8–8.9)
NEUTROPHILS NFR BLD AUTO: 47 % (ref 43–81)
PLATELET # BLD AUTO: 285 /CMM (ref 150–450)
POTASSIUM SERPL-SCNC: 4.8 MMOL/L (ref 3.5–5.1)
PROT SERPL-MCNC: 7.4 G/DL (ref 6.4–8.2)
RBC # BLD AUTO: 4.31 MIL/UL (ref 4–5.2)
SODIUM SERPL-SCNC: 133 MMOL/L (ref 136–145)
WBC NRBC COR # BLD AUTO: 4.9 K/UL (ref 4.3–11)

## 2020-01-23 PROCEDURE — 80048 BASIC METABOLIC PNL TOTAL CA: CPT

## 2020-01-23 PROCEDURE — 85025 COMPLETE CBC W/AUTO DIFF WBC: CPT

## 2020-01-23 PROCEDURE — 96374 THER/PROPH/DIAG INJ IV PUSH: CPT

## 2020-01-23 PROCEDURE — 96361 HYDRATE IV INFUSION ADD-ON: CPT

## 2020-01-23 PROCEDURE — 99283 EMERGENCY DEPT VISIT LOW MDM: CPT

## 2020-01-23 PROCEDURE — 80076 HEPATIC FUNCTION PANEL: CPT

## 2020-01-23 PROCEDURE — 36415 COLL VENOUS BLD VENIPUNCTURE: CPT

## 2020-01-23 NOTE — NUR
ALLERGIES TO FACE. PATIENT A/OX4, BREATHING EVEN AND UNLABORED, NO SOB NOTED, 
NEEDS ATTENDED, KEPT COMFORTABLE.

## 2020-02-16 ENCOUNTER — HOSPITAL ENCOUNTER (EMERGENCY)
Dept: HOSPITAL 54 - ER | Age: 45
Discharge: HOME | End: 2020-02-16
Payer: COMMERCIAL

## 2020-02-16 VITALS — DIASTOLIC BLOOD PRESSURE: 69 MMHG | SYSTOLIC BLOOD PRESSURE: 105 MMHG

## 2020-02-16 VITALS — BODY MASS INDEX: 20.09 KG/M2 | WEIGHT: 128 LBS | HEIGHT: 67 IN
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
## 2020-02-16 DIAGNOSIS — J45.909: ICD-10-CM

## 2020-02-16 DIAGNOSIS — Z88.5: ICD-10-CM

## 2020-02-16 DIAGNOSIS — I10: ICD-10-CM

## 2020-02-16 DIAGNOSIS — Z88.0: ICD-10-CM

## 2020-02-16 DIAGNOSIS — T78.1XXA: Primary | ICD-10-CM

## 2020-02-16 DIAGNOSIS — F10.10: ICD-10-CM

## 2020-02-16 DIAGNOSIS — H57.89: ICD-10-CM

## 2020-02-16 DIAGNOSIS — Z79.899: ICD-10-CM

## 2020-02-16 DIAGNOSIS — F17.200: ICD-10-CM

## 2020-06-20 ENCOUNTER — HOSPITAL ENCOUNTER (EMERGENCY)
Dept: HOSPITAL 54 - ER | Age: 45
Discharge: HOME | End: 2020-06-20
Payer: COMMERCIAL

## 2020-06-20 VITALS — SYSTOLIC BLOOD PRESSURE: 127 MMHG | DIASTOLIC BLOOD PRESSURE: 81 MMHG

## 2020-06-20 VITALS — HEIGHT: 67 IN | BODY MASS INDEX: 20.09 KG/M2 | WEIGHT: 128 LBS

## 2020-06-20 DIAGNOSIS — Z79.899: ICD-10-CM

## 2020-06-20 DIAGNOSIS — Z88.0: ICD-10-CM

## 2020-06-20 DIAGNOSIS — F17.200: ICD-10-CM

## 2020-06-20 DIAGNOSIS — Z88.8: ICD-10-CM

## 2020-06-20 DIAGNOSIS — I10: ICD-10-CM
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
## 2020-06-20 DIAGNOSIS — G62.9: ICD-10-CM

## 2020-06-20 DIAGNOSIS — T78.40XA: Primary | ICD-10-CM

## 2020-06-20 DIAGNOSIS — J45.909: ICD-10-CM

## 2020-06-20 PROCEDURE — 99283 EMERGENCY DEPT VISIT LOW MDM: CPT

## 2020-06-20 NOTE — NUR
PT CAME TO THE ED C/O GENERALIZED RASH AND ITCHING. WORSE TODAY. -SOB. PT 
AAOX4, VSS, RESPIRATIONS EVEN AND UNLABORED ON RA W/ NAD NOTED. PT CONNECTED TO 
THE MONITOR AND POX

## 2020-07-03 ENCOUNTER — HOSPITAL ENCOUNTER (EMERGENCY)
Dept: HOSPITAL 54 - ER | Age: 45
Discharge: HOME | End: 2020-07-03
Payer: COMMERCIAL

## 2020-07-03 VITALS — HEIGHT: 67 IN | WEIGHT: 130 LBS | BODY MASS INDEX: 20.4 KG/M2

## 2020-07-03 VITALS — SYSTOLIC BLOOD PRESSURE: 100 MMHG | DIASTOLIC BLOOD PRESSURE: 66 MMHG

## 2020-07-03 DIAGNOSIS — I10: ICD-10-CM

## 2020-07-03 DIAGNOSIS — J45.909: ICD-10-CM

## 2020-07-03 DIAGNOSIS — Z79.899: ICD-10-CM

## 2020-07-03 DIAGNOSIS — Z88.8: ICD-10-CM

## 2020-07-03 DIAGNOSIS — Z88.0: ICD-10-CM

## 2020-07-03 DIAGNOSIS — L29.9: Primary | ICD-10-CM

## 2020-07-03 DIAGNOSIS — F17.200: ICD-10-CM

## 2020-07-03 DIAGNOSIS — G62.9: ICD-10-CM

## 2020-08-08 ENCOUNTER — HOSPITAL ENCOUNTER (EMERGENCY)
Dept: HOSPITAL 54 - ER | Age: 45
LOS: 1 days | Discharge: HOME | End: 2020-08-09
Payer: COMMERCIAL

## 2020-08-08 VITALS — WEIGHT: 130 LBS | BODY MASS INDEX: 20.4 KG/M2 | HEIGHT: 67 IN

## 2020-08-08 VITALS — DIASTOLIC BLOOD PRESSURE: 60 MMHG | SYSTOLIC BLOOD PRESSURE: 138 MMHG

## 2020-08-08 DIAGNOSIS — R00.2: Primary | ICD-10-CM

## 2020-08-08 DIAGNOSIS — Z79.899: ICD-10-CM

## 2020-08-08 DIAGNOSIS — Z88.5: ICD-10-CM

## 2020-08-08 DIAGNOSIS — J45.909: ICD-10-CM

## 2020-08-08 DIAGNOSIS — I10: ICD-10-CM

## 2020-08-08 DIAGNOSIS — Z88.0: ICD-10-CM

## 2020-09-09 ENCOUNTER — HOSPITAL ENCOUNTER (INPATIENT)
Dept: HOSPITAL 54 - ER | Age: 45
LOS: 2 days | Discharge: HOME | DRG: 563 | End: 2020-09-11
Attending: NURSE PRACTITIONER | Admitting: INTERNAL MEDICINE
Payer: COMMERCIAL

## 2020-09-09 ENCOUNTER — HOSPITAL ENCOUNTER (EMERGENCY)
Dept: HOSPITAL 54 - ER | Age: 45
Discharge: HOME | End: 2020-09-09
Payer: COMMERCIAL

## 2020-09-09 VITALS — HEIGHT: 67 IN | BODY MASS INDEX: 20.72 KG/M2 | WEIGHT: 132 LBS

## 2020-09-09 VITALS — SYSTOLIC BLOOD PRESSURE: 137 MMHG | DIASTOLIC BLOOD PRESSURE: 71 MMHG

## 2020-09-09 VITALS — HEIGHT: 67 IN | WEIGHT: 130 LBS | BODY MASS INDEX: 20.4 KG/M2

## 2020-09-09 VITALS — DIASTOLIC BLOOD PRESSURE: 75 MMHG | SYSTOLIC BLOOD PRESSURE: 132 MMHG

## 2020-09-09 DIAGNOSIS — M79.7: ICD-10-CM

## 2020-09-09 DIAGNOSIS — I10: ICD-10-CM

## 2020-09-09 DIAGNOSIS — Z98.82: ICD-10-CM

## 2020-09-09 DIAGNOSIS — F32.9: ICD-10-CM

## 2020-09-09 DIAGNOSIS — O99.511: ICD-10-CM

## 2020-09-09 DIAGNOSIS — F41.9: ICD-10-CM

## 2020-09-09 DIAGNOSIS — F19.10: ICD-10-CM

## 2020-09-09 DIAGNOSIS — O20.8: Primary | ICD-10-CM

## 2020-09-09 DIAGNOSIS — G89.29: ICD-10-CM

## 2020-09-09 DIAGNOSIS — G62.9: ICD-10-CM

## 2020-09-09 DIAGNOSIS — Z3A.01: ICD-10-CM

## 2020-09-09 DIAGNOSIS — N39.0: ICD-10-CM

## 2020-09-09 DIAGNOSIS — D72.829: ICD-10-CM

## 2020-09-09 DIAGNOSIS — Z79.899: ICD-10-CM

## 2020-09-09 DIAGNOSIS — O20.0: Primary | ICD-10-CM

## 2020-09-09 DIAGNOSIS — Z88.0: ICD-10-CM

## 2020-09-09 DIAGNOSIS — Z88.5: ICD-10-CM

## 2020-09-09 DIAGNOSIS — F41.0: ICD-10-CM

## 2020-09-09 DIAGNOSIS — J45.909: ICD-10-CM

## 2020-09-09 DIAGNOSIS — Z91.018: ICD-10-CM

## 2020-09-09 LAB
ALBUMIN SERPL BCP-MCNC: 4.5 G/DL (ref 3.4–5)
ALP SERPL-CCNC: 27 U/L (ref 46–116)
ALT SERPL W P-5'-P-CCNC: 18 U/L (ref 12–78)
AST SERPL W P-5'-P-CCNC: 22 U/L (ref 15–37)
BASOPHILS # BLD AUTO: 0.1 /CMM (ref 0–0.2)
BASOPHILS NFR BLD AUTO: 0.7 % (ref 0–2)
BILIRUB DIRECT SERPL-MCNC: 0.1 MG/DL (ref 0–0.2)
BILIRUB SERPL-MCNC: 0.9 MG/DL (ref 0.2–1)
BUN SERPL-MCNC: 8 MG/DL (ref 7–18)
CALCIUM SERPL-MCNC: 9.3 MG/DL (ref 8.5–10.1)
CHLORIDE SERPL-SCNC: 101 MMOL/L (ref 98–107)
CO2 SERPL-SCNC: 23 MMOL/L (ref 21–32)
CREAT SERPL-MCNC: 0.9 MG/DL (ref 0.6–1.3)
EOSINOPHIL NFR BLD AUTO: 2.5 % (ref 0–6)
ETHANOL SERPL-MCNC: < 3 MG/DL (ref 0–0)
GLUCOSE SERPL-MCNC: 117 MG/DL (ref 74–106)
HCT VFR BLD AUTO: 40 % (ref 33–45)
HGB BLD-MCNC: 13 G/DL (ref 11.5–14.8)
KETONES UR STRIP-MCNC: 40 MG/DL
LIPASE SERPL-CCNC: 98 U/L (ref 73–393)
LYMPHOCYTES NFR BLD AUTO: 18.8 % (ref 20–44)
LYMPHOCYTES NFR BLD AUTO: 2.7 /CMM (ref 0.8–4.8)
MCHC RBC AUTO-ENTMCNC: 32 G/DL (ref 31–36)
MCV RBC AUTO: 94 FL (ref 82–100)
MONOCYTES NFR BLD AUTO: 0.5 /CMM (ref 0.1–1.3)
MONOCYTES NFR BLD AUTO: 3.6 % (ref 2–12)
NEUTROPHILS # BLD AUTO: 10.5 /CMM (ref 1.8–8.9)
NEUTROPHILS NFR BLD AUTO: 74.4 % (ref 43–81)
PH UR STRIP: >9 [PH] (ref 5–8)
PLATELET # BLD AUTO: 296 /CMM (ref 150–450)
POTASSIUM SERPL-SCNC: 3.5 MMOL/L (ref 3.5–5.1)
PROT SERPL-MCNC: 7.3 G/DL (ref 6.4–8.2)
RBC # BLD AUTO: 4.27 MIL/UL (ref 4–5.2)
SODIUM SERPL-SCNC: 137 MMOL/L (ref 136–145)
UROBILINOGEN UR STRIP-MCNC: 0.2 EU/DL
WBC #/AREA URNS HPF: (no result) /HPF (ref 0–3)
WBC NRBC COR # BLD AUTO: 14.1 K/UL (ref 4.3–11)

## 2020-09-09 PROCEDURE — 80307 DRUG TEST PRSMV CHEM ANLYZR: CPT

## 2020-09-09 PROCEDURE — 96374 THER/PROPH/DIAG INJ IV PUSH: CPT

## 2020-09-09 PROCEDURE — 85025 COMPLETE CBC W/AUTO DIFF WBC: CPT

## 2020-09-09 PROCEDURE — G0378 HOSPITAL OBSERVATION PER HR: HCPCS

## 2020-09-09 PROCEDURE — 84702 CHORIONIC GONADOTROPIN TEST: CPT

## 2020-09-09 PROCEDURE — 87077 CULTURE AEROBIC IDENTIFY: CPT

## 2020-09-09 PROCEDURE — 87086 URINE CULTURE/COLONY COUNT: CPT

## 2020-09-09 PROCEDURE — 96361 HYDRATE IV INFUSION ADD-ON: CPT

## 2020-09-09 PROCEDURE — 96376 TX/PRO/DX INJ SAME DRUG ADON: CPT

## 2020-09-09 PROCEDURE — 80305 DRUG TEST PRSMV DIR OPT OBS: CPT

## 2020-09-09 PROCEDURE — 81001 URINALYSIS AUTO W/SCOPE: CPT

## 2020-09-09 PROCEDURE — G0480 DRUG TEST DEF 1-7 CLASSES: HCPCS

## 2020-09-09 PROCEDURE — 86850 RBC ANTIBODY SCREEN: CPT

## 2020-09-09 PROCEDURE — 83690 ASSAY OF LIPASE: CPT

## 2020-09-09 PROCEDURE — 87186 SC STD MICRODIL/AGAR DIL: CPT

## 2020-09-09 PROCEDURE — 36415 COLL VENOUS BLD VENIPUNCTURE: CPT

## 2020-09-09 PROCEDURE — 87081 CULTURE SCREEN ONLY: CPT

## 2020-09-09 PROCEDURE — 80076 HEPATIC FUNCTION PANEL: CPT

## 2020-09-09 PROCEDURE — 80048 BASIC METABOLIC PNL TOTAL CA: CPT

## 2020-09-09 PROCEDURE — 76856 US EXAM PELVIC COMPLETE: CPT

## 2020-09-09 PROCEDURE — 99284 EMERGENCY DEPT VISIT MOD MDM: CPT

## 2020-09-09 PROCEDURE — 96375 TX/PRO/DX INJ NEW DRUG ADDON: CPT

## 2020-09-09 NOTE — NUR
rn tele admitting notes

 received patient from er awake alert and oriented x4, respirations even and unlabored with 
equal rise and fall of chest, iv site to left ac #20g intact and patent, no redness, no 
infiltration present, oriented to staff, room and call light and kept within reach safety 
precautions rendered, low bed and locked, belongings list done, medications given to 
pharmacy receipt in chart, cardiac monitor intact sr 81, discussed plan of care, denies skin 
issues, all needs attended at this time, denies any pain at this time, will continue to 
monitor and attend to needs.

## 2020-09-09 NOTE — NUR
BIB ,ABDOMINAL PAIN THAT STARTED WHILE WALKING HER DOG THIS MORNING. PT 
AAOX4, VSS. RR EVEN & UNLABORED. DENIES CP, SOB, DIZZINESS AT THIS TIME. PT 
SEEN & EVAL'D BY DR. JENKINS. WILL CONT TO MONITOR.

## 2020-09-09 NOTE — NUR
PT'S AFTER GOT DC'D, C/O SEVERE ABD PAIN & NAUSEA. PT AAOX4, VSS. RR EVEN & 
UNLABORED. DENIES CP, SOB, DIZZINESS AT THIS TIME. AWAITING EVAL & ORDERS FROM 
DR. JENKINS. WILL CONT TO MONITOR.

## 2020-09-10 VITALS — DIASTOLIC BLOOD PRESSURE: 74 MMHG | SYSTOLIC BLOOD PRESSURE: 106 MMHG

## 2020-09-10 VITALS — DIASTOLIC BLOOD PRESSURE: 76 MMHG | SYSTOLIC BLOOD PRESSURE: 136 MMHG

## 2020-09-10 VITALS — SYSTOLIC BLOOD PRESSURE: 115 MMHG | DIASTOLIC BLOOD PRESSURE: 62 MMHG

## 2020-09-10 VITALS — DIASTOLIC BLOOD PRESSURE: 54 MMHG | SYSTOLIC BLOOD PRESSURE: 110 MMHG

## 2020-09-10 LAB
BASOPHILS # BLD AUTO: 0.1 /CMM (ref 0–0.2)
BASOPHILS NFR BLD AUTO: 0.7 % (ref 0–2)
BUN SERPL-MCNC: 3 MG/DL (ref 7–18)
CALCIUM SERPL-MCNC: 7.9 MG/DL (ref 8.5–10.1)
CHLORIDE SERPL-SCNC: 106 MMOL/L (ref 98–107)
CO2 SERPL-SCNC: 23 MMOL/L (ref 21–32)
CREAT SERPL-MCNC: 0.7 MG/DL (ref 0.6–1.3)
EOSINOPHIL NFR BLD AUTO: 2.6 % (ref 0–6)
GLUCOSE SERPL-MCNC: 100 MG/DL (ref 74–106)
HCT VFR BLD AUTO: 35 % (ref 33–45)
HGB BLD-MCNC: 11.8 G/DL (ref 11.5–14.8)
LYMPHOCYTES NFR BLD AUTO: 2.8 /CMM (ref 0.8–4.8)
LYMPHOCYTES NFR BLD AUTO: 27.9 % (ref 20–44)
MAGNESIUM SERPL-MCNC: 2.2 MG/DL (ref 1.8–2.4)
MCHC RBC AUTO-ENTMCNC: 33 G/DL (ref 31–36)
MCV RBC AUTO: 92 FL (ref 82–100)
MONOCYTES NFR BLD AUTO: 0.6 /CMM (ref 0.1–1.3)
MONOCYTES NFR BLD AUTO: 6.3 % (ref 2–12)
NEUTROPHILS # BLD AUTO: 6.3 /CMM (ref 1.8–8.9)
NEUTROPHILS NFR BLD AUTO: 62.5 % (ref 43–81)
PHOSPHATE SERPL-MCNC: 3.2 MG/DL (ref 2.5–4.9)
PLATELET # BLD AUTO: 226 /CMM (ref 150–450)
POTASSIUM SERPL-SCNC: 3.7 MMOL/L (ref 3.5–5.1)
RBC # BLD AUTO: 3.85 MIL/UL (ref 4–5.2)
SODIUM SERPL-SCNC: 139 MMOL/L (ref 136–145)
WBC NRBC COR # BLD AUTO: 10.1 K/UL (ref 4.3–11)

## 2020-09-10 RX ADMIN — DEXTROSE MONOHYDRATE PRN MG: 50 INJECTION, SOLUTION INTRAVENOUS at 10:09

## 2020-09-10 RX ADMIN — CLONIDINE HYDROCHLORIDE SCH MG: 0.1 TABLET ORAL at 09:45

## 2020-09-10 RX ADMIN — CLONIDINE HYDROCHLORIDE SCH MG: 0.1 TABLET ORAL at 13:00

## 2020-09-10 RX ADMIN — OXYCODONE HYDROCHLORIDE AND ACETAMINOPHEN PRN UDTAB: 5; 325 TABLET ORAL at 20:13

## 2020-09-10 RX ADMIN — FLUTICASONE FUROATE AND VILANTEROL TRIFENATATE SCH EACH: 100; 25 POWDER RESPIRATORY (INHALATION) at 10:08

## 2020-09-10 RX ADMIN — Medication PRN MG: at 09:44

## 2020-09-10 RX ADMIN — Medication PRN MG: at 17:38

## 2020-09-10 RX ADMIN — CLONIDINE HYDROCHLORIDE SCH MG: 0.1 TABLET ORAL at 17:37

## 2020-09-10 RX ADMIN — MONTELUKAST SODIUM SCH MG: 10 TABLET, FILM COATED ORAL at 09:45

## 2020-09-10 RX ADMIN — ACETAMINOPHEN PRN MG: 325 TABLET ORAL at 17:33

## 2020-09-10 RX ADMIN — DEXTROSE MONOHYDRATE PRN MG: 50 INJECTION, SOLUTION INTRAVENOUS at 01:37

## 2020-09-10 RX ADMIN — SODIUM CHLORIDE PRN MLS/HR: 9 INJECTION, SOLUTION INTRAVENOUS at 17:47

## 2020-09-10 RX ADMIN — ACETAMINOPHEN PRN MG: 325 TABLET ORAL at 09:44

## 2020-09-10 RX ADMIN — CETIRIZINE HYDROCHLORIDE SCH MG: 10 TABLET, FILM COATED ORAL at 09:45

## 2020-09-10 RX ADMIN — Medication PRN MG: at 23:54

## 2020-09-10 RX ADMIN — Medication SCH MG: at 09:45

## 2020-09-10 RX ADMIN — SODIUM CHLORIDE PRN MLS/HR: 9 INJECTION, SOLUTION INTRAVENOUS at 00:14

## 2020-09-10 RX ADMIN — LORAZEPAM PRN MG: 1 TABLET ORAL at 21:47

## 2020-09-10 RX ADMIN — VITAMIN D, TAB 1000IU (100/BT) SCH UNIT: 25 TAB at 09:48

## 2020-09-10 RX ADMIN — DEXTROSE MONOHYDRATE PRN MG: 50 INJECTION, SOLUTION INTRAVENOUS at 17:53

## 2020-09-10 RX ADMIN — Medication PRN MG: at 01:34

## 2020-09-10 RX ADMIN — FOLIC ACID SCH MG: 1 TABLET ORAL at 09:45

## 2020-09-10 RX ADMIN — Medication PRN MG: at 05:58

## 2020-09-10 NOTE — NUR
rn tele notes

 patient complained of pain to abd area 5/10 requested for morphine and zofran 

vs wnl 

prn  morphine and zofran given will continue to monitor for effectiveness.

## 2020-09-10 NOTE — NUR
RN NOTES

RECEIVED PT. AWAKE ON BED, TALKING ON HER PHONE BUT COMPLAINING OF GENERALIZED PAIN- ADVISED 
PT. THAT WE NEED TO CHECK HER BLOOD PRESSURE FIRST SO WE WILL KNOW WHAT KIND OF PAIN 
MEDICATION WE CAN GIVE IT TO HER, CALL LIGHT WITHIN REACH, [id number], CONTINUE TO 
MONITOR

## 2020-09-10 NOTE — NUR
TELE RN NOTES



RECEIVED PATIENT IN BED ASLEEP. AROUSABLE TO VERBAL AND TACTILE STIMULI. HOB ELEVATED. NO 
SOB. DENIES ANY C/O PAIN NOR DISCOMFORT AT THIS TIME. PER PATIENT NO VAGINAL BLEEDING DURING 
THE NIGHT. ON TELE MONITORING SR: 73. AMBULATORY WITH STEADY GAIT. BED IN LOWEST POSITION, 
LOCKED. CALL LIGHT WITHIN REACH. ABLE TO VERBALIZE NEEDS.

## 2020-09-10 NOTE — NUR
rn tele notes

 patient complained of pain to abd area 6/10 requested for morphine 

vs wnl 

prn  morphine given will continue to monitor for effectiveness.

## 2020-09-10 NOTE — NUR
MS RN NOTES



PATIENT RESTING COMFORTABLY IN BED. HOB ELEVATED. NO S/S OF RESPIRATORY DISTRESS. DENIES ANY 
C/O PAIN NOR DISCOMFORT AT THIS TIME. PER PATIENT NO VAGINAL BLEEDING NOTED DURING THE 
SHIFT. ATE DINNER WITH GOOD APPETITE.  AMBULATORY WITH STEADY GAIT. DURING THE SHIFT, 
OBSERVED PATIENT PLAYING ON HER PHONE BUT WHEN ATTENDED TO, PATIENT C/O PAIN AND CHANGES 
STORIES ABOUT PAST EVENTS THAT LEAD TO HER PAIN AND STARTS CRYING. PATIENT ALSO STATED THAT 
HER ANXIETY LEVEL IS HIGH DESPITE OF RELAXATION TECHNIQUES AND DISTRACTIONS USED. BED IN 
LOWEST POSITION, LOCKED. CALL LIGHT WITHIN REACH. ABLE TO VERBALIZE NEEDS. IN NO APPARENT 
DISTRESS.

## 2020-09-10 NOTE — NUR
MS RN NOTES



PATIENT SEEN AND EXAMINED BY DR. NORRIS. PER PSYCH MD CONTINUE ATIVAN PRN AS ORDERED.

## 2020-09-10 NOTE — NUR
rn tele notes

 patient requested for sleep medication ambien prn offered, patient agreed , prn ambien 
given as ordered, will continue to monitor for effectiveness.

## 2020-09-10 NOTE — NUR
rn tele closing notes

 patient awake alert and oriented x4, respirations even and unlabored with equal rise and 
fall of chest, iv site to left ac #20g intact and patent, no redness, no infiltration 
present, antibiotics given as ordered, ivf running ,call light kept within reach safety 
precautions rendered, low bed and locked, medications given to pharmacy receipt in chart, 
cardiac monitor intact sr 88, discussed plan of care, denies skin issues, all needs attended 
at this time, denies any pain at this time, will continue to monitor and attend to needs and 
endorse to next shift. noted minimal/scant vaginal bleeding on tampon. vs wnl. pain 
management rendered.

## 2020-09-11 VITALS — DIASTOLIC BLOOD PRESSURE: 69 MMHG | SYSTOLIC BLOOD PRESSURE: 120 MMHG

## 2020-09-11 LAB
BASOPHILS # BLD AUTO: 0.1 /CMM (ref 0–0.2)
BASOPHILS NFR BLD AUTO: 1.2 % (ref 0–2)
BUN SERPL-MCNC: 2 MG/DL (ref 7–18)
CALCIUM SERPL-MCNC: 8.1 MG/DL (ref 8.5–10.1)
CHLORIDE SERPL-SCNC: 108 MMOL/L (ref 98–107)
CO2 SERPL-SCNC: 27 MMOL/L (ref 21–32)
CREAT SERPL-MCNC: 0.8 MG/DL (ref 0.6–1.3)
EOSINOPHIL NFR BLD AUTO: 6.5 % (ref 0–6)
GLUCOSE SERPL-MCNC: 86 MG/DL (ref 74–106)
HCT VFR BLD AUTO: 37 % (ref 33–45)
HGB BLD-MCNC: 12 G/DL (ref 11.5–14.8)
LYMPHOCYTES NFR BLD AUTO: 2.4 /CMM (ref 0.8–4.8)
LYMPHOCYTES NFR BLD AUTO: 44.9 % (ref 20–44)
MCHC RBC AUTO-ENTMCNC: 33 G/DL (ref 31–36)
MCV RBC AUTO: 93 FL (ref 82–100)
MONOCYTES NFR BLD AUTO: 0.3 /CMM (ref 0.1–1.3)
MONOCYTES NFR BLD AUTO: 6.1 % (ref 2–12)
NEUTROPHILS # BLD AUTO: 2.2 /CMM (ref 1.8–8.9)
NEUTROPHILS NFR BLD AUTO: 41.3 % (ref 43–81)
PLATELET # BLD AUTO: 238 /CMM (ref 150–450)
POTASSIUM SERPL-SCNC: 3.5 MMOL/L (ref 3.5–5.1)
RBC # BLD AUTO: 3.92 MIL/UL (ref 4–5.2)
SODIUM SERPL-SCNC: 144 MMOL/L (ref 136–145)
WBC NRBC COR # BLD AUTO: 5.3 K/UL (ref 4.3–11)

## 2020-09-11 RX ADMIN — DEXTROSE MONOHYDRATE PRN MG: 50 INJECTION, SOLUTION INTRAVENOUS at 08:25

## 2020-09-11 RX ADMIN — OXYCODONE HYDROCHLORIDE AND ACETAMINOPHEN PRN UDTAB: 5; 325 TABLET ORAL at 04:37

## 2020-09-11 RX ADMIN — CLONIDINE HYDROCHLORIDE SCH MG: 0.1 TABLET ORAL at 13:48

## 2020-09-11 RX ADMIN — MONTELUKAST SODIUM SCH MG: 10 TABLET, FILM COATED ORAL at 08:19

## 2020-09-11 RX ADMIN — FLUTICASONE FUROATE AND VILANTEROL TRIFENATATE SCH EACH: 100; 25 POWDER RESPIRATORY (INHALATION) at 09:20

## 2020-09-11 RX ADMIN — FOLIC ACID SCH MG: 1 TABLET ORAL at 08:19

## 2020-09-11 RX ADMIN — CLONIDINE HYDROCHLORIDE SCH MG: 0.1 TABLET ORAL at 08:20

## 2020-09-11 RX ADMIN — ACETAMINOPHEN PRN MG: 325 TABLET ORAL at 10:54

## 2020-09-11 RX ADMIN — CETIRIZINE HYDROCHLORIDE SCH MG: 10 TABLET, FILM COATED ORAL at 08:21

## 2020-09-11 RX ADMIN — VITAMIN D, TAB 1000IU (100/BT) SCH UNIT: 25 TAB at 08:19

## 2020-09-11 RX ADMIN — Medication PRN MG: at 13:48

## 2020-09-11 RX ADMIN — Medication PRN MG: at 08:25

## 2020-09-11 RX ADMIN — LORAZEPAM PRN MG: 1 TABLET ORAL at 10:55

## 2020-09-11 RX ADMIN — OXYCODONE HYDROCHLORIDE AND ACETAMINOPHEN PRN UDTAB: 5; 325 TABLET ORAL at 11:53

## 2020-09-11 RX ADMIN — SODIUM CHLORIDE PRN MLS/HR: 9 INJECTION, SOLUTION INTRAVENOUS at 05:48

## 2020-09-11 RX ADMIN — Medication SCH MG: at 08:20

## 2020-09-11 NOTE — NUR
consult requested by Grace Dawkins MD for an advance directive. Patient 
is a 45 year-old  female, alert and oriented x4. Patient was wearing dark 
sunglasses, patient reports feeling depressed. Patient shared that she had just had a 
miscarriage and this is the reason for her depression. This SW provided an advanced care 
direct form for the patient. Patient to look over at a better time. Patient stated that this 
is for her boyfriend to make decisions as she does not speak to her family. Patient asked 
this SW if patient should return advanced health directive right after discharge. This SW 
informed her that its best to have copies of it, carry them on her person, in case of an 
emergency and patient's boyfriend should also have a copy. This SW informed her that in the 
future if she returns to [facility], to give it to a nurse or a SW to make a copy to be placed in 
the chart. 



SW to remain available for all needs regarding this patient.

## 2020-09-11 NOTE — NUR
RN NOTES

GOT AN ORDER OF BENADRYL 25MG PO X1 , PT. TOOK IT AT THIS TIME, V/S STABLE, MORNING CARE 
RENDERED, NOT IN DSITRESS, PT. NEEDS ATTENDED

## 2020-09-11 NOTE — NUR
consult requested by Grace Dawkins MD for an advance directive. Patient 
asked this SW to return as she was on the phone with her mobile carrier. SW to return after 
lunch.

## 2020-09-11 NOTE — NUR
MS/RN NOTES

PATIENT IS ALERT AND ORIENTED X4. DENIES PAIN AT TIME. NO APPARENT RESPIRATORY DISTRESS 
NOTED. IN ROOM AIR SATURATION IS %. RESPIRATION REGULAR AND UNLABORED. SEEN AND 
EXAMINED BY MD WITH ORDERS MADE AND CARRIED OUT. PATIENT WAS GIVEN DISCHARGED INSTRUCTIONS 
AND PATIENT VERBALIZED UNDERSTANDING. THE PATIENT LEFT THE HOSPITAL IN STABLE CONDITION. 
PATIENT IS AMBULATORY. DISCHARGED AT 1510.

## 2020-09-11 NOTE — NUR
MS/RN OPENING NOTES

RECEIVED PATIENT ON BED AWAKE ALERT AND ORIENTED X4. PATIENT DENIES PAIN AT THIS TIME. 
PATIENT IN NO APPARENT RESPIRATORY DISTRESS NOTED. WILL CONTINUE TO MONITOR.

## 2020-09-11 NOTE — NUR
RN NOTES

PT. WAS ASKING IF SHE CAN TAKE ANOTHER DOSE OF 25MG PO OF BENADRYL, EXPALINED TO THE PT. 
THAT WE WEILL INFORM THE DOCTOR FIRST

## 2020-09-11 NOTE — NUR
MS/RN NOTES

PATIENT COMPLAINED OF HEADACHE AND FEEL ANXIOUS GIVE ACETAMINOPHEN 650M TAB AND ATIVAN 0.5MG 
TAB. WILL CONTINUE TO MONITOR.

## 2020-09-23 ENCOUNTER — HOSPITAL ENCOUNTER (EMERGENCY)
Dept: HOSPITAL 54 - ER | Age: 45
Discharge: HOME | End: 2020-09-23
Payer: COMMERCIAL

## 2020-09-23 VITALS — DIASTOLIC BLOOD PRESSURE: 71 MMHG | SYSTOLIC BLOOD PRESSURE: 116 MMHG

## 2020-09-23 VITALS — HEIGHT: 67 IN | WEIGHT: 125 LBS | BODY MASS INDEX: 19.62 KG/M2

## 2020-09-23 DIAGNOSIS — Z88.5: ICD-10-CM

## 2020-09-23 DIAGNOSIS — Z79.899: ICD-10-CM

## 2020-09-23 DIAGNOSIS — Z88.0: ICD-10-CM

## 2020-09-23 DIAGNOSIS — I10: ICD-10-CM

## 2020-09-23 DIAGNOSIS — Z91.018: ICD-10-CM

## 2020-09-23 DIAGNOSIS — B37.3: Primary | ICD-10-CM

## 2020-09-23 DIAGNOSIS — Z98.890: ICD-10-CM

## 2020-09-23 LAB
BASOPHILS # BLD AUTO: 0.1 /CMM (ref 0–0.2)
BASOPHILS NFR BLD AUTO: 1.1 % (ref 0–2)
BUN SERPL-MCNC: 16 MG/DL (ref 7–18)
CALCIUM SERPL-MCNC: 9.1 MG/DL (ref 8.5–10.1)
CHLORIDE SERPL-SCNC: 102 MMOL/L (ref 98–107)
CO2 SERPL-SCNC: 31 MMOL/L (ref 21–32)
CREAT SERPL-MCNC: 0.9 MG/DL (ref 0.6–1.3)
EOSINOPHIL NFR BLD AUTO: 8.1 % (ref 0–6)
GLUCOSE SERPL-MCNC: 97 MG/DL (ref 74–106)
HCT VFR BLD AUTO: 38 % (ref 33–45)
HGB BLD-MCNC: 12.5 G/DL (ref 11.5–14.8)
LYMPHOCYTES NFR BLD AUTO: 2.8 /CMM (ref 0.8–4.8)
LYMPHOCYTES NFR BLD AUTO: 59.5 % (ref 20–44)
MCHC RBC AUTO-ENTMCNC: 33 G/DL (ref 31–36)
MCV RBC AUTO: 92 FL (ref 82–100)
MONOCYTES NFR BLD AUTO: 0.3 /CMM (ref 0.1–1.3)
MONOCYTES NFR BLD AUTO: 6.6 % (ref 2–12)
NEUTROPHILS # BLD AUTO: 1.2 /CMM (ref 1.8–8.9)
NEUTROPHILS NFR BLD AUTO: 24.7 % (ref 43–81)
PLATELET # BLD AUTO: 282 /CMM (ref 150–450)
POTASSIUM SERPL-SCNC: 4.1 MMOL/L (ref 3.5–5.1)
RBC # BLD AUTO: 4.09 MIL/UL (ref 4–5.2)
SODIUM SERPL-SCNC: 139 MMOL/L (ref 136–145)
WBC NRBC COR # BLD AUTO: 4.8 K/UL (ref 4.3–11)

## 2020-09-23 PROCEDURE — 85025 COMPLETE CBC W/AUTO DIFF WBC: CPT

## 2020-09-23 PROCEDURE — 36415 COLL VENOUS BLD VENIPUNCTURE: CPT

## 2020-09-23 PROCEDURE — 99284 EMERGENCY DEPT VISIT MOD MDM: CPT

## 2020-09-23 PROCEDURE — 80048 BASIC METABOLIC PNL TOTAL CA: CPT

## 2020-09-23 PROCEDURE — 84702 CHORIONIC GONADOTROPIN TEST: CPT

## 2020-09-23 PROCEDURE — 96372 THER/PROPH/DIAG INJ SC/IM: CPT

## 2020-09-23 PROCEDURE — 76856 US EXAM PELVIC COMPLETE: CPT

## 2020-09-23 NOTE — NUR
PT BIBS FOR C/O VAGINAL PAIN S/P . PT PLACED IN BED 7 ON MONITOR AND 
PULSE OX. VSS. AWAITING MD FOR EVAL AND ORDERS.

## 2020-11-07 ENCOUNTER — HOSPITAL ENCOUNTER (EMERGENCY)
Dept: HOSPITAL 54 - ER | Age: 45
Discharge: HOME | End: 2020-11-07
Payer: COMMERCIAL

## 2020-11-07 ENCOUNTER — HOSPITAL ENCOUNTER (EMERGENCY)
Dept: HOSPITAL 12 - ER | Age: 45
Discharge: HOME | End: 2020-11-07
Payer: COMMERCIAL

## 2020-11-07 VITALS — DIASTOLIC BLOOD PRESSURE: 91 MMHG | SYSTOLIC BLOOD PRESSURE: 171 MMHG

## 2020-11-07 VITALS — BODY MASS INDEX: 19.62 KG/M2 | HEIGHT: 67 IN | WEIGHT: 125 LBS

## 2020-11-07 VITALS — WEIGHT: 125 LBS | BODY MASS INDEX: 19.62 KG/M2 | HEIGHT: 67 IN

## 2020-11-07 VITALS — SYSTOLIC BLOOD PRESSURE: 129 MMHG | DIASTOLIC BLOOD PRESSURE: 98 MMHG

## 2020-11-07 DIAGNOSIS — Z91.018: ICD-10-CM

## 2020-11-07 DIAGNOSIS — R07.89: Primary | ICD-10-CM

## 2020-11-07 DIAGNOSIS — R10.30: ICD-10-CM

## 2020-11-07 DIAGNOSIS — Z88.0: ICD-10-CM

## 2020-11-07 DIAGNOSIS — R51.9: ICD-10-CM

## 2020-11-07 DIAGNOSIS — Z79.899: ICD-10-CM

## 2020-11-07 DIAGNOSIS — Z88.6: ICD-10-CM

## 2020-11-07 DIAGNOSIS — F17.200: ICD-10-CM

## 2020-11-07 DIAGNOSIS — J45.909: ICD-10-CM

## 2020-11-07 DIAGNOSIS — M79.7: ICD-10-CM

## 2020-11-07 DIAGNOSIS — F41.0: ICD-10-CM

## 2020-11-07 DIAGNOSIS — I10: ICD-10-CM

## 2020-11-07 DIAGNOSIS — G62.9: ICD-10-CM

## 2020-11-07 DIAGNOSIS — R10.32: Primary | ICD-10-CM

## 2020-11-07 DIAGNOSIS — Z98.890: ICD-10-CM

## 2020-11-07 LAB
ALP SERPL-CCNC: 39 U/L (ref 50–136)
ALT SERPL W/O P-5'-P-CCNC: 17 U/L (ref 14–59)
APPEARANCE UR: CLEAR
AST SERPL-CCNC: 12 U/L (ref 15–37)
BASOPHILS # BLD AUTO: 0.1 K/UL (ref 0–8)
BASOPHILS NFR BLD AUTO: 0.9 % (ref 0–2)
BILIRUB DIRECT SERPL-MCNC: 0.2 MG/DL (ref 0–0.2)
BILIRUB SERPL-MCNC: 1.1 MG/DL (ref 0.2–1)
BILIRUB UR QL STRIP: NEGATIVE
BUN SERPL-MCNC: 6 MG/DL (ref 7–18)
CHLORIDE SERPL-SCNC: 103 MMOL/L (ref 98–107)
CO2 SERPL-SCNC: 31 MMOL/L (ref 21–32)
COLOR UR: YELLOW
CREAT SERPL-MCNC: 1 MG/DL (ref 0.6–1.3)
EOSINOPHIL # BLD AUTO: 0.3 K/UL (ref 0–0.7)
EOSINOPHIL NFR BLD AUTO: 4.9 % (ref 0–7)
GLUCOSE SERPL-MCNC: 88 MG/DL (ref 74–106)
GLUCOSE UR STRIP-MCNC: NEGATIVE MG/DL
HCG UR QL: NEGATIVE
HCT VFR BLD AUTO: 38.1 % (ref 31.2–41.9)
HGB BLD-MCNC: 13 G/DL (ref 10.9–14.3)
HGB UR QL STRIP: NEGATIVE
KETONES UR STRIP-MCNC: NEGATIVE MG/DL
LEUKOCYTE ESTERASE UR QL STRIP: NEGATIVE
LYMPHOCYTES # BLD AUTO: 2.6 K/UL (ref 20–40)
LYMPHOCYTES NFR BLD AUTO: 40.6 % (ref 20.5–51.5)
MCH RBC QN AUTO: 31.9 UUG (ref 24.7–32.8)
MCHC RBC AUTO-ENTMCNC: 34 G/DL (ref 32.3–35.6)
MCV RBC AUTO: 93.5 FL (ref 75.5–95.3)
MONOCYTES # BLD AUTO: 0.5 K/UL (ref 2–10)
MONOCYTES NFR BLD AUTO: 7.6 % (ref 0–11)
NEUTROPHILS # BLD AUTO: 3 K/UL (ref 1.8–8.9)
NEUTROPHILS NFR BLD AUTO: 46 % (ref 38.5–71.5)
NITRITE UR QL STRIP: NEGATIVE
PH UR STRIP: 6 [PH] (ref 5–8)
PLATELET # BLD AUTO: 345 K/UL (ref 179–408)
POTASSIUM SERPL-SCNC: 4.1 MMOL/L (ref 3.5–5.1)
RBC # BLD AUTO: 4.07 MIL/UL (ref 3.63–4.92)
SP GR UR STRIP: 1.01 (ref 1–1.03)
UROBILINOGEN UR STRIP-MCNC: 0.2 E.U./DL
WBC # BLD AUTO: 6.5 K/UL (ref 3.8–11.8)
WS STN SPEC: 7.2 G/DL (ref 6.4–8.2)

## 2020-11-07 PROCEDURE — A4663 DIALYSIS BLOOD PRESSURE CUFF: HCPCS

## 2020-11-07 NOTE — NUR
Patient discharged to home in stable condition. Took all belongings. no signs 
of acute distress. Written and verbal after care instructions given. Patient 
verbalizes understanding of instructions. Stressed follow up or return to ER 
for worsening s/s.

## 2020-12-18 ENCOUNTER — HOSPITAL ENCOUNTER (EMERGENCY)
Dept: HOSPITAL 12 - ER | Age: 45
Discharge: HOME | End: 2020-12-18
Payer: COMMERCIAL

## 2020-12-18 VITALS — BODY MASS INDEX: 18.83 KG/M2 | HEIGHT: 67 IN | WEIGHT: 120 LBS

## 2020-12-18 DIAGNOSIS — R10.9: Primary | ICD-10-CM

## 2020-12-18 DIAGNOSIS — Z82.49: ICD-10-CM

## 2020-12-18 DIAGNOSIS — M79.7: ICD-10-CM

## 2020-12-18 DIAGNOSIS — Z88.5: ICD-10-CM

## 2020-12-18 DIAGNOSIS — Z88.0: ICD-10-CM

## 2020-12-18 DIAGNOSIS — J45.909: ICD-10-CM

## 2020-12-18 DIAGNOSIS — F41.0: ICD-10-CM

## 2020-12-18 DIAGNOSIS — Z88.6: ICD-10-CM

## 2020-12-18 LAB
ALP SERPL-CCNC: 32 U/L (ref 50–136)
ALT SERPL W/O P-5'-P-CCNC: 20 U/L (ref 14–59)
AST SERPL-CCNC: 9 U/L (ref 15–37)
BASOPHILS # BLD AUTO: 0.1 K/UL (ref 0–8)
BASOPHILS NFR BLD AUTO: 1.3 % (ref 0–2)
BILIRUB DIRECT SERPL-MCNC: 0.3 MG/DL (ref 0–0.2)
BILIRUB SERPL-MCNC: 1 MG/DL (ref 0.2–1)
BUN SERPL-MCNC: 6 MG/DL (ref 7–18)
BUN SERPL-MCNC: 7 MG/DL (ref 7–18)
CHLORIDE SERPL-SCNC: 100 MMOL/L (ref 98–107)
CHLORIDE SERPL-SCNC: 101 MMOL/L (ref 98–107)
CO2 SERPL-SCNC: 28 MMOL/L (ref 21–32)
CO2 SERPL-SCNC: 31 MMOL/L (ref 21–32)
CREAT SERPL-MCNC: 0.9 MG/DL (ref 0.6–1.3)
CREAT SERPL-MCNC: 1 MG/DL (ref 0.6–1.3)
EOSINOPHIL # BLD AUTO: 0.3 K/UL (ref 0–0.7)
EOSINOPHIL NFR BLD AUTO: 7.3 % (ref 0–7)
GLUCOSE SERPL-MCNC: 101 MG/DL (ref 74–106)
GLUCOSE SERPL-MCNC: 99 MG/DL (ref 74–106)
HCT VFR BLD AUTO: 34.8 % (ref 31.2–41.9)
HGB BLD-MCNC: 11.7 G/DL (ref 10.9–14.3)
LYMPHOCYTES # BLD AUTO: 2.3 K/UL (ref 20–40)
LYMPHOCYTES NFR BLD AUTO: 56.7 % (ref 20.5–51.5)
MCH RBC QN AUTO: 31.1 UUG (ref 24.7–32.8)
MCHC RBC AUTO-ENTMCNC: 34 G/DL (ref 32.3–35.6)
MCV RBC AUTO: 92.6 FL (ref 75.5–95.3)
MONOCYTES # BLD AUTO: 0.3 K/UL (ref 2–10)
MONOCYTES NFR BLD AUTO: 8.3 % (ref 0–11)
NEUTROPHILS # BLD AUTO: 1.1 K/UL (ref 1.8–8.9)
NEUTROPHILS NFR BLD AUTO: 26.4 % (ref 38.5–71.5)
PLATELET # BLD AUTO: 243 K/UL (ref 179–408)
POTASSIUM SERPL-SCNC: 3.9 MMOL/L (ref 3.5–5.1)
POTASSIUM SERPL-SCNC: 4.3 MMOL/L (ref 3.5–5.1)
RBC # BLD AUTO: 3.76 MIL/UL (ref 3.63–4.92)
WBC # BLD AUTO: 4.1 K/UL (ref 3.8–11.8)
WS STN SPEC: 6.6 G/DL (ref 6.4–8.2)

## 2020-12-18 PROCEDURE — 85025 COMPLETE CBC W/AUTO DIFF WBC: CPT

## 2020-12-18 PROCEDURE — 84702 CHORIONIC GONADOTROPIN TEST: CPT

## 2020-12-18 PROCEDURE — 36415 COLL VENOUS BLD VENIPUNCTURE: CPT

## 2020-12-18 PROCEDURE — 80076 HEPATIC FUNCTION PANEL: CPT

## 2020-12-18 PROCEDURE — 99284 EMERGENCY DEPT VISIT MOD MDM: CPT

## 2020-12-18 PROCEDURE — C9113 INJ PANTOPRAZOLE SODIUM, VIA: HCPCS

## 2020-12-18 PROCEDURE — 96374 THER/PROPH/DIAG INJ IV PUSH: CPT

## 2020-12-18 PROCEDURE — A4663 DIALYSIS BLOOD PRESSURE CUFF: HCPCS

## 2020-12-18 PROCEDURE — 80048 BASIC METABOLIC PNL TOTAL CA: CPT

## 2020-12-18 NOTE — NUR
Writer assumes care.  Hands off report received from Dr Davis.   This patient 
is for repeat BMP@around 8 o'clock and for discharge after.  Patient is resting 
comfortably on gurney with eyes closed.  No vomiting seen since admission to ER 
or nausea expressed by patient.  PATIENT IS PAIN FREE AT THIS TIME.

## 2020-12-18 NOTE — NUR
IV removed.  Catheter intact and site benign. Pressure and 4x4 gauze applied to 
site. No bleeding noted.  Patient discharged to home in stable condition with 
brisk steady gait.  Written and verbal after care instructions given. Patient 
verbalizes understanding & compliance of instructions. Stressed follow up with 
primary doctor/ dermatologist or return to ER for worsening s/s.

## 2021-06-29 ENCOUNTER — HOSPITAL ENCOUNTER (EMERGENCY)
Dept: HOSPITAL 12 - ER | Age: 46
Discharge: HOME | End: 2021-06-29
Payer: COMMERCIAL

## 2021-06-29 VITALS — DIASTOLIC BLOOD PRESSURE: 77 MMHG | SYSTOLIC BLOOD PRESSURE: 102 MMHG

## 2021-06-29 VITALS — HEIGHT: 67 IN | WEIGHT: 130 LBS | BODY MASS INDEX: 20.4 KG/M2
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
## 2021-06-29 DIAGNOSIS — Z98.82: ICD-10-CM
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
## 2021-06-29 DIAGNOSIS — J45.909: ICD-10-CM

## 2021-06-29 DIAGNOSIS — I10: ICD-10-CM

## 2021-06-29 DIAGNOSIS — F41.9: ICD-10-CM

## 2021-06-29 DIAGNOSIS — S92.512A: Primary | ICD-10-CM

## 2021-06-29 DIAGNOSIS — M79.7: ICD-10-CM

## 2021-06-29 DIAGNOSIS — Z88.0: ICD-10-CM

## 2021-06-29 DIAGNOSIS — Z88.5: ICD-10-CM

## 2021-06-29 PROCEDURE — 96372 THER/PROPH/DIAG INJ SC/IM: CPT

## 2021-06-29 PROCEDURE — 73630 X-RAY EXAM OF FOOT: CPT

## 2021-06-29 PROCEDURE — A4663 DIALYSIS BLOOD PRESSURE CUFF: HCPCS

## 2021-06-29 PROCEDURE — 99283 EMERGENCY DEPT VISIT LOW MDM: CPT

## 2021-06-29 NOTE — NUR
Dr. Padron at bedside, MSE in progress.
Patient discharged to home in stable condition. A/O x4, no SOB or labored 
breathing. Educated pt on crutches. Written and verbal after care instructions 
given. Patient verbalizes understanding of instructions. Stressed follow up or 
return to ER for worsening s/s. Picked up by boyfriend.
Pt BIB  from home for c/o left foot pain PL:8/10. No SOB or labored 
breathing. Afebrile. A/O x4.
Xray at bedside.
[FreeTextEntry1] : Denies any chest pain, palpitation, shortness of breath or dizziness.  He has chronic bilateral leg edema left greater than right.  It is it seems unchanged.

## 2021-08-16 ENCOUNTER — HOSPITAL ENCOUNTER (EMERGENCY)
Dept: HOSPITAL 12 - ER | Age: 46
Discharge: HOME | End: 2021-08-16
Payer: COMMERCIAL

## 2021-08-16 VITALS — BODY MASS INDEX: 18.83 KG/M2 | WEIGHT: 120 LBS | HEIGHT: 67 IN

## 2021-08-16 VITALS — DIASTOLIC BLOOD PRESSURE: 75 MMHG | SYSTOLIC BLOOD PRESSURE: 128 MMHG

## 2021-08-16 DIAGNOSIS — M79.7: ICD-10-CM

## 2021-08-16 DIAGNOSIS — J45.909: ICD-10-CM

## 2021-08-16 DIAGNOSIS — R11.10: ICD-10-CM

## 2021-08-16 DIAGNOSIS — R51.9: Primary | ICD-10-CM

## 2021-08-16 DIAGNOSIS — Z82.0: ICD-10-CM

## 2021-08-16 DIAGNOSIS — Z86.69: ICD-10-CM

## 2021-08-16 DIAGNOSIS — F41.9: ICD-10-CM

## 2021-08-16 DIAGNOSIS — Z98.82: ICD-10-CM

## 2021-08-16 LAB
ALP SERPL-CCNC: 39 U/L (ref 50–136)
ALT SERPL W/O P-5'-P-CCNC: 11 U/L (ref 14–59)
AST SERPL-CCNC: 8 U/L (ref 15–37)
BILIRUB DIRECT SERPL-MCNC: 0.2 MG/DL (ref 0–0.2)
BILIRUB SERPL-MCNC: 0.9 MG/DL (ref 0.2–1)
BUN SERPL-MCNC: 8 MG/DL (ref 7–18)
CHLORIDE SERPL-SCNC: 98 MMOL/L (ref 98–107)
CO2 SERPL-SCNC: 27 MMOL/L (ref 21–32)
CREAT SERPL-MCNC: 0.9 MG/DL (ref 0.6–1.3)
GLUCOSE SERPL-MCNC: 101 MG/DL (ref 74–106)
HCT VFR BLD AUTO: 38.2 % (ref 31.2–41.9)
MCH RBC QN AUTO: 32.6 UUG (ref 24.7–32.8)
MCV RBC AUTO: 95.3 FL (ref 75.5–95.3)
PLATELET # BLD AUTO: 345 K/UL (ref 179–408)
POTASSIUM SERPL-SCNC: 3.7 MMOL/L (ref 3.5–5.1)
WS STN SPEC: 7.3 G/DL (ref 6.4–8.2)

## 2021-08-16 PROCEDURE — 80076 HEPATIC FUNCTION PANEL: CPT

## 2021-08-16 PROCEDURE — 99284 EMERGENCY DEPT VISIT MOD MDM: CPT

## 2021-08-16 PROCEDURE — A4663 DIALYSIS BLOOD PRESSURE CUFF: HCPCS

## 2021-08-16 PROCEDURE — 96375 TX/PRO/DX INJ NEW DRUG ADDON: CPT

## 2021-08-16 PROCEDURE — 85025 COMPLETE CBC W/AUTO DIFF WBC: CPT

## 2021-08-16 PROCEDURE — 36415 COLL VENOUS BLD VENIPUNCTURE: CPT

## 2021-08-16 PROCEDURE — 84702 CHORIONIC GONADOTROPIN TEST: CPT

## 2021-08-16 PROCEDURE — 96361 HYDRATE IV INFUSION ADD-ON: CPT

## 2021-08-16 PROCEDURE — 80048 BASIC METABOLIC PNL TOTAL CA: CPT

## 2021-08-16 PROCEDURE — 96374 THER/PROPH/DIAG INJ IV PUSH: CPT
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
## 2021-10-31 NOTE — NUR
THE PATIENT IS RECEIVED IN ER BED #14. RESPONSIVE TO VERBAL STIMULI. 
RESPIRATION REGULAR AND UNLABORED. WILL CONTINUE TO MONITOR THE PATIENT.

## 2021-10-31 NOTE — NUR
PT NOTED WITH RASHES FROM FOOD ALLERGIC REACTION. GAVE MEDS AS ORDERED. VSS. 
RESP EVEN AND NONLABORED. O2SAT 99% ON R/A. NOTIFIED DIETARY OF PT'S ALLERGIES.

## 2021-10-31 NOTE — NUR
ACCEPTED TO [facility], UNDER THE CARE OF DR. MORGAN.

GOING TO [facility] ROOM 149-B. GIVE REPORT TO RAHUL VARGAS/CN TEL# TO GIVE REPORT 
[phone].

## 2021-10-31 NOTE — NUR
[facility]

[phone] TEL

[phone] FAX

## 2021-11-08 ENCOUNTER — HOSPITAL ENCOUNTER (EMERGENCY)
Dept: HOSPITAL 12 - ER | Age: 46
Discharge: HOME | End: 2021-11-08
Payer: COMMERCIAL

## 2021-11-08 VITALS — BODY MASS INDEX: 18.83 KG/M2 | WEIGHT: 120 LBS | HEIGHT: 67 IN

## 2021-11-08 VITALS — SYSTOLIC BLOOD PRESSURE: 111 MMHG | DIASTOLIC BLOOD PRESSURE: 65 MMHG

## 2021-11-08 DIAGNOSIS — F41.9: ICD-10-CM

## 2021-11-08 DIAGNOSIS — Z88.0: ICD-10-CM

## 2021-11-08 DIAGNOSIS — Z88.6: ICD-10-CM

## 2021-11-08 DIAGNOSIS — J45.909: ICD-10-CM

## 2021-11-08 DIAGNOSIS — G43.909: Primary | ICD-10-CM

## 2021-11-08 DIAGNOSIS — M79.7: ICD-10-CM

## 2021-11-08 DIAGNOSIS — G47.00: ICD-10-CM

## 2021-11-08 LAB — HCG UR QL: (no result)

## 2021-11-08 PROCEDURE — A4663 DIALYSIS BLOOD PRESSURE CUFF: HCPCS

## 2021-11-08 NOTE — NUR
Patient discharged to home in stable condition.  Written and verbal after care 
instructions given. 

Patient verbalizes understanding of instructions. Stressed follow up or return 
to ER for worsening s/s.PT WALKS IN STEADY GAIT.